# Patient Record
Sex: MALE | Race: WHITE | NOT HISPANIC OR LATINO | Employment: OTHER | URBAN - METROPOLITAN AREA
[De-identification: names, ages, dates, MRNs, and addresses within clinical notes are randomized per-mention and may not be internally consistent; named-entity substitution may affect disease eponyms.]

---

## 2019-01-01 ENCOUNTER — APPOINTMENT (OUTPATIENT)
Dept: RADIOLOGY | Facility: MEDICAL CENTER | Age: 49
DRG: 981 | End: 2019-01-01
Attending: INTERNAL MEDICINE

## 2019-01-01 ENCOUNTER — HOSPITAL ENCOUNTER (OUTPATIENT)
Facility: MEDICAL CENTER | Age: 49
DRG: 981 | End: 2019-03-15

## 2019-01-01 ENCOUNTER — HOSPITAL ENCOUNTER (INPATIENT)
Facility: MEDICAL CENTER | Age: 49
LOS: 4 days | DRG: 981 | End: 2019-03-19
Attending: HOSPITALIST | Admitting: INTERNAL MEDICINE

## 2019-01-01 ENCOUNTER — APPOINTMENT (OUTPATIENT)
Dept: CARDIOLOGY | Facility: MEDICAL CENTER | Age: 49
DRG: 981 | End: 2019-01-01
Attending: INTERNAL MEDICINE

## 2019-01-01 VITALS
TEMPERATURE: 101.8 F | HEIGHT: 71 IN | BODY MASS INDEX: 44.1 KG/M2 | HEART RATE: 76 BPM | OXYGEN SATURATION: 95 % | WEIGHT: 315 LBS

## 2019-01-01 DIAGNOSIS — J96.01 ACUTE HYPOXEMIC RESPIRATORY FAILURE (HCC): ICD-10-CM

## 2019-01-01 LAB
ACTION RANGE TRIGGERED IACRT: NO
ACTION RANGE TRIGGERED IACRT: NO
ACTION RANGE TRIGGERED IACRT: YES
ALBUMIN SERPL BCP-MCNC: 2.7 G/DL (ref 3.2–4.9)
ALBUMIN SERPL BCP-MCNC: 2.8 G/DL (ref 3.2–4.9)
ALBUMIN SERPL BCP-MCNC: 2.8 G/DL (ref 3.2–4.9)
ALBUMIN/GLOB SERPL: 0.8 G/DL
ALP SERPL-CCNC: 54 U/L (ref 30–99)
ALP SERPL-CCNC: 55 U/L (ref 30–99)
ALP SERPL-CCNC: 57 U/L (ref 30–99)
ALT SERPL-CCNC: 12 U/L (ref 2–50)
ALT SERPL-CCNC: 13 U/L (ref 2–50)
ALT SERPL-CCNC: 13 U/L (ref 2–50)
AMMONIA PLAS-SCNC: 53 UMOL/L (ref 11–45)
ANION GAP SERPL CALC-SCNC: 11 MMOL/L (ref 0–11.9)
ANION GAP SERPL CALC-SCNC: 5 MMOL/L (ref 0–11.9)
ANION GAP SERPL CALC-SCNC: 6 MMOL/L (ref 0–11.9)
ANION GAP SERPL CALC-SCNC: 7 MMOL/L (ref 0–11.9)
ANION GAP SERPL CALC-SCNC: 7 MMOL/L (ref 0–11.9)
ANION GAP SERPL CALC-SCNC: 9 MMOL/L (ref 0–11.9)
ANION GAP SERPL CALC-SCNC: 9 MMOL/L (ref 0–11.9)
APPEARANCE UR: CLEAR
APTT PPP: 30.8 SEC (ref 24.7–36)
AST SERPL-CCNC: 21 U/L (ref 12–45)
AST SERPL-CCNC: 27 U/L (ref 12–45)
AST SERPL-CCNC: 27 U/L (ref 12–45)
BACTERIA #/AREA URNS HPF: ABNORMAL /HPF
BACTERIA SPEC RESP CULT: NORMAL
BACTERIA SPEC RESP CULT: NORMAL
BASE EXCESS BLDA CALC-SCNC: 5 MMOL/L (ref -4–3)
BASE EXCESS BLDA CALC-SCNC: 7 MMOL/L (ref -4–3)
BASE EXCESS BLDA CALC-SCNC: 8 MMOL/L (ref -4–3)
BASE EXCESS BLDA CALC-SCNC: 9 MMOL/L (ref -4–3)
BASE EXCESS BLDA CALC-SCNC: 9 MMOL/L (ref -4–3)
BASOPHILS # BLD AUTO: 0.6 % (ref 0–1.8)
BASOPHILS # BLD AUTO: 0.6 % (ref 0–1.8)
BASOPHILS # BLD AUTO: 0.7 % (ref 0–1.8)
BASOPHILS # BLD AUTO: 0.8 % (ref 0–1.8)
BASOPHILS # BLD AUTO: 0.9 % (ref 0–1.8)
BASOPHILS # BLD: 0.05 K/UL (ref 0–0.12)
BASOPHILS # BLD: 0.07 K/UL (ref 0–0.12)
BASOPHILS # BLD: 0.08 K/UL (ref 0–0.12)
BILIRUB SERPL-MCNC: 0.9 MG/DL (ref 0.1–1.5)
BILIRUB SERPL-MCNC: 1.2 MG/DL (ref 0.1–1.5)
BILIRUB SERPL-MCNC: 1.2 MG/DL (ref 0.1–1.5)
BILIRUB UR QL STRIP.AUTO: NEGATIVE
BLOOD CULTURE HOLD CXBCH: NORMAL
BLOOD CULTURE HOLD CXBCH: NORMAL
BODY TEMPERATURE: ABNORMAL DEGREES
BUN SERPL-MCNC: 22 MG/DL (ref 8–22)
BUN SERPL-MCNC: 22 MG/DL (ref 8–22)
BUN SERPL-MCNC: 23 MG/DL (ref 8–22)
BUN SERPL-MCNC: 24 MG/DL (ref 8–22)
BUN SERPL-MCNC: 27 MG/DL (ref 8–22)
CALCIUM SERPL-MCNC: 8.3 MG/DL (ref 8.5–10.5)
CALCIUM SERPL-MCNC: 8.4 MG/DL (ref 8.5–10.5)
CALCIUM SERPL-MCNC: 8.5 MG/DL (ref 8.5–10.5)
CALCIUM SERPL-MCNC: 8.8 MG/DL (ref 8.5–10.5)
CALCIUM SERPL-MCNC: 8.9 MG/DL (ref 8.5–10.5)
CALCIUM SERPL-MCNC: 9.1 MG/DL (ref 8.5–10.5)
CALCIUM SERPL-MCNC: 9.3 MG/DL (ref 8.5–10.5)
CHLORIDE SERPL-SCNC: 101 MMOL/L (ref 96–112)
CHLORIDE SERPL-SCNC: 102 MMOL/L (ref 96–112)
CHLORIDE SERPL-SCNC: 103 MMOL/L (ref 96–112)
CHLORIDE SERPL-SCNC: 104 MMOL/L (ref 96–112)
CHLORIDE SERPL-SCNC: 105 MMOL/L (ref 96–112)
CHLORIDE SERPL-SCNC: 106 MMOL/L (ref 96–112)
CHLORIDE SERPL-SCNC: 107 MMOL/L (ref 96–112)
CO2 BLDA-SCNC: 32 MMOL/L (ref 20–33)
CO2 BLDA-SCNC: 34 MMOL/L (ref 20–33)
CO2 BLDA-SCNC: 35 MMOL/L (ref 20–33)
CO2 BLDA-SCNC: 36 MMOL/L (ref 20–33)
CO2 BLDA-SCNC: 37 MMOL/L (ref 20–33)
CO2 BLDA-SCNC: 37 MMOL/L (ref 20–33)
CO2 BLDA-SCNC: 39 MMOL/L (ref 20–33)
CO2 BLDA-SCNC: 39 MMOL/L (ref 20–33)
CO2 SERPL-SCNC: 32 MMOL/L (ref 20–33)
CO2 SERPL-SCNC: 33 MMOL/L (ref 20–33)
CO2 SERPL-SCNC: 34 MMOL/L (ref 20–33)
CO2 SERPL-SCNC: 35 MMOL/L (ref 20–33)
CO2 SERPL-SCNC: 36 MMOL/L (ref 20–33)
CO2 SERPL-SCNC: 36 MMOL/L (ref 20–33)
CO2 SERPL-SCNC: 39 MMOL/L (ref 20–33)
COLOR UR: YELLOW
CREAT SERPL-MCNC: 0.98 MG/DL (ref 0.5–1.4)
CREAT SERPL-MCNC: 1.03 MG/DL (ref 0.5–1.4)
CREAT SERPL-MCNC: 1.13 MG/DL (ref 0.5–1.4)
CREAT SERPL-MCNC: 1.17 MG/DL (ref 0.5–1.4)
CREAT SERPL-MCNC: 1.17 MG/DL (ref 0.5–1.4)
CREAT SERPL-MCNC: 1.22 MG/DL (ref 0.5–1.4)
CREAT SERPL-MCNC: 1.29 MG/DL (ref 0.5–1.4)
CRP SERPL HS-MCNC: 15.88 MG/DL (ref 0–0.75)
CRP SERPL HS-MCNC: 21.74 MG/DL (ref 0–0.75)
CYTOLOGY REG CYTOL: NORMAL
EKG IMPRESSION: NORMAL
EOSINOPHIL # BLD AUTO: 0.11 K/UL (ref 0–0.51)
EOSINOPHIL # BLD AUTO: 0.14 K/UL (ref 0–0.51)
EOSINOPHIL # BLD AUTO: 0.16 K/UL (ref 0–0.51)
EOSINOPHIL # BLD AUTO: 0.19 K/UL (ref 0–0.51)
EOSINOPHIL # BLD AUTO: 0.21 K/UL (ref 0–0.51)
EOSINOPHIL NFR BLD: 1.2 % (ref 0–6.9)
EOSINOPHIL NFR BLD: 1.3 % (ref 0–6.9)
EOSINOPHIL NFR BLD: 2.1 % (ref 0–6.9)
EOSINOPHIL NFR BLD: 2.1 % (ref 0–6.9)
EOSINOPHIL NFR BLD: 2.3 % (ref 0–6.9)
EPI CELLS #/AREA URNS HPF: ABNORMAL /HPF
ERYTHROCYTE [DISTWIDTH] IN BLOOD BY AUTOMATED COUNT: 55.1 FL (ref 35.9–50)
ERYTHROCYTE [DISTWIDTH] IN BLOOD BY AUTOMATED COUNT: 55.3 FL (ref 35.9–50)
ERYTHROCYTE [DISTWIDTH] IN BLOOD BY AUTOMATED COUNT: 55.6 FL (ref 35.9–50)
ERYTHROCYTE [DISTWIDTH] IN BLOOD BY AUTOMATED COUNT: 56.2 FL (ref 35.9–50)
ERYTHROCYTE [DISTWIDTH] IN BLOOD BY AUTOMATED COUNT: 56.7 FL (ref 35.9–50)
EST. AVERAGE GLUCOSE BLD GHB EST-MCNC: 217 MG/DL
GLOBULIN SER CALC-MCNC: 3.3 G/DL (ref 1.9–3.5)
GLOBULIN SER CALC-MCNC: 3.4 G/DL (ref 1.9–3.5)
GLOBULIN SER CALC-MCNC: 3.5 G/DL (ref 1.9–3.5)
GLUCOSE BLD-MCNC: 104 MG/DL (ref 65–99)
GLUCOSE BLD-MCNC: 128 MG/DL (ref 65–99)
GLUCOSE BLD-MCNC: 129 MG/DL (ref 65–99)
GLUCOSE BLD-MCNC: 137 MG/DL (ref 65–99)
GLUCOSE BLD-MCNC: 142 MG/DL (ref 65–99)
GLUCOSE BLD-MCNC: 145 MG/DL (ref 65–99)
GLUCOSE BLD-MCNC: 146 MG/DL (ref 65–99)
GLUCOSE BLD-MCNC: 147 MG/DL (ref 65–99)
GLUCOSE BLD-MCNC: 152 MG/DL (ref 65–99)
GLUCOSE BLD-MCNC: 157 MG/DL (ref 65–99)
GLUCOSE BLD-MCNC: 162 MG/DL (ref 65–99)
GLUCOSE BLD-MCNC: 175 MG/DL (ref 65–99)
GLUCOSE BLD-MCNC: 188 MG/DL (ref 65–99)
GLUCOSE SERPL-MCNC: 119 MG/DL (ref 65–99)
GLUCOSE SERPL-MCNC: 135 MG/DL (ref 65–99)
GLUCOSE SERPL-MCNC: 135 MG/DL (ref 65–99)
GLUCOSE SERPL-MCNC: 136 MG/DL (ref 65–99)
GLUCOSE SERPL-MCNC: 163 MG/DL (ref 65–99)
GLUCOSE SERPL-MCNC: 170 MG/DL (ref 65–99)
GLUCOSE SERPL-MCNC: 206 MG/DL (ref 65–99)
GLUCOSE UR STRIP.AUTO-MCNC: NEGATIVE MG/DL
GRAM STN SPEC: NORMAL
HBA1C MFR BLD: 9.2 % (ref 0–5.6)
HCO3 BLDA-SCNC: 30.8 MMOL/L (ref 17–25)
HCO3 BLDA-SCNC: 32.8 MMOL/L (ref 17–25)
HCO3 BLDA-SCNC: 33.6 MMOL/L (ref 17–25)
HCO3 BLDA-SCNC: 34.3 MMOL/L (ref 17–25)
HCO3 BLDA-SCNC: 35.5 MMOL/L (ref 17–25)
HCO3 BLDA-SCNC: 35.6 MMOL/L (ref 17–25)
HCO3 BLDA-SCNC: 36.7 MMOL/L (ref 17–25)
HCO3 BLDA-SCNC: 36.8 MMOL/L (ref 17–25)
HCT VFR BLD AUTO: 53.5 % (ref 42–52)
HCT VFR BLD AUTO: 54.2 % (ref 42–52)
HCT VFR BLD AUTO: 54.9 % (ref 42–52)
HCT VFR BLD AUTO: 58.1 % (ref 42–52)
HCT VFR BLD AUTO: 58.8 % (ref 42–52)
HGB BLD-MCNC: 16.4 G/DL (ref 14–18)
HGB BLD-MCNC: 16.5 G/DL (ref 14–18)
HGB BLD-MCNC: 16.6 G/DL (ref 14–18)
HGB BLD-MCNC: 18.5 G/DL (ref 14–18)
HGB BLD-MCNC: 18.5 G/DL (ref 14–18)
HOROWITZ INDEX BLDA+IHG-RTO: 100 MM[HG]
HOROWITZ INDEX BLDA+IHG-RTO: 128 MM[HG]
HOROWITZ INDEX BLDA+IHG-RTO: 176 MM[HG]
HOROWITZ INDEX BLDA+IHG-RTO: 193 MM[HG]
HOROWITZ INDEX BLDA+IHG-RTO: 311 MM[HG]
HOROWITZ INDEX BLDA+IHG-RTO: 68 MM[HG]
HOROWITZ INDEX BLDA+IHG-RTO: 69 MM[HG]
HOROWITZ INDEX BLDA+IHG-RTO: 96 MM[HG]
HYALINE CASTS #/AREA URNS LPF: ABNORMAL /LPF
IMM GRANULOCYTES # BLD AUTO: 0.02 K/UL (ref 0–0.11)
IMM GRANULOCYTES # BLD AUTO: 0.02 K/UL (ref 0–0.11)
IMM GRANULOCYTES # BLD AUTO: 0.04 K/UL (ref 0–0.11)
IMM GRANULOCYTES # BLD AUTO: 0.05 K/UL (ref 0–0.11)
IMM GRANULOCYTES # BLD AUTO: 0.06 K/UL (ref 0–0.11)
IMM GRANULOCYTES NFR BLD AUTO: 0.2 % (ref 0–0.9)
IMM GRANULOCYTES NFR BLD AUTO: 0.3 % (ref 0–0.9)
IMM GRANULOCYTES NFR BLD AUTO: 0.4 % (ref 0–0.9)
IMM GRANULOCYTES NFR BLD AUTO: 0.5 % (ref 0–0.9)
IMM GRANULOCYTES NFR BLD AUTO: 0.5 % (ref 0–0.9)
INR PPP: 1.33 (ref 0.87–1.13)
INST. QUALIFIED PATIENT IIQPT: YES
KETONES UR STRIP.AUTO-MCNC: NEGATIVE MG/DL
LEUKOCYTE ESTERASE UR QL STRIP.AUTO: NEGATIVE
LIPASE SERPL-CCNC: 36 U/L (ref 11–82)
LYMPHOCYTES # BLD AUTO: 0.58 K/UL (ref 1–4.8)
LYMPHOCYTES # BLD AUTO: 0.75 K/UL (ref 1–4.8)
LYMPHOCYTES # BLD AUTO: 0.91 K/UL (ref 1–4.8)
LYMPHOCYTES # BLD AUTO: 1.02 K/UL (ref 1–4.8)
LYMPHOCYTES # BLD AUTO: 1.02 K/UL (ref 1–4.8)
LYMPHOCYTES NFR BLD: 12.2 % (ref 22–41)
LYMPHOCYTES NFR BLD: 13.2 % (ref 22–41)
LYMPHOCYTES NFR BLD: 6.2 % (ref 22–41)
LYMPHOCYTES NFR BLD: 6.8 % (ref 22–41)
LYMPHOCYTES NFR BLD: 9.1 % (ref 22–41)
MAGNESIUM SERPL-MCNC: 1.8 MG/DL (ref 1.5–2.5)
MAGNESIUM SERPL-MCNC: 1.8 MG/DL (ref 1.5–2.5)
MAGNESIUM SERPL-MCNC: 2 MG/DL (ref 1.5–2.5)
MAGNESIUM SERPL-MCNC: 2.1 MG/DL (ref 1.5–2.5)
MAGNESIUM SERPL-MCNC: 2.2 MG/DL (ref 1.5–2.5)
MCH RBC QN AUTO: 29.7 PG (ref 27–33)
MCH RBC QN AUTO: 29.8 PG (ref 27–33)
MCH RBC QN AUTO: 29.8 PG (ref 27–33)
MCH RBC QN AUTO: 29.9 PG (ref 27–33)
MCH RBC QN AUTO: 30.5 PG (ref 27–33)
MCHC RBC AUTO-ENTMCNC: 30.2 G/DL (ref 33.7–35.3)
MCHC RBC AUTO-ENTMCNC: 30.4 G/DL (ref 33.7–35.3)
MCHC RBC AUTO-ENTMCNC: 30.7 G/DL (ref 33.7–35.3)
MCHC RBC AUTO-ENTMCNC: 31.5 G/DL (ref 33.7–35.3)
MCHC RBC AUTO-ENTMCNC: 31.8 G/DL (ref 33.7–35.3)
MCV RBC AUTO: 94.7 FL (ref 81.4–97.8)
MCV RBC AUTO: 95.7 FL (ref 81.4–97.8)
MCV RBC AUTO: 96.9 FL (ref 81.4–97.8)
MCV RBC AUTO: 97.8 FL (ref 81.4–97.8)
MCV RBC AUTO: 98.7 FL (ref 81.4–97.8)
MICRO URNS: ABNORMAL
MONOCYTES # BLD AUTO: 0.66 K/UL (ref 0–0.85)
MONOCYTES # BLD AUTO: 0.77 K/UL (ref 0–0.85)
MONOCYTES # BLD AUTO: 1.02 K/UL (ref 0–0.85)
MONOCYTES # BLD AUTO: 1.02 K/UL (ref 0–0.85)
MONOCYTES # BLD AUTO: 1.03 K/UL (ref 0–0.85)
MONOCYTES NFR BLD AUTO: 10.3 % (ref 0–13.4)
MONOCYTES NFR BLD AUTO: 10.9 % (ref 0–13.4)
MONOCYTES NFR BLD AUTO: 12.2 % (ref 0–13.4)
MONOCYTES NFR BLD AUTO: 7 % (ref 0–13.4)
MONOCYTES NFR BLD AUTO: 8.5 % (ref 0–13.4)
NEUTROPHILS # BLD AUTO: 5.81 K/UL (ref 1.82–7.42)
NEUTROPHILS # BLD AUTO: 6.09 K/UL (ref 1.82–7.42)
NEUTROPHILS # BLD AUTO: 7.58 K/UL (ref 1.82–7.42)
NEUTROPHILS # BLD AUTO: 7.72 K/UL (ref 1.82–7.42)
NEUTROPHILS # BLD AUTO: 9.26 K/UL (ref 1.82–7.42)
NEUTROPHILS NFR BLD: 72.5 % (ref 44–72)
NEUTROPHILS NFR BLD: 75 % (ref 44–72)
NEUTROPHILS NFR BLD: 77.2 % (ref 44–72)
NEUTROPHILS NFR BLD: 80.6 % (ref 44–72)
NEUTROPHILS NFR BLD: 83.8 % (ref 44–72)
NITRITE UR QL STRIP.AUTO: NEGATIVE
NRBC # BLD AUTO: 0 K/UL
NRBC BLD-RTO: 0 /100 WBC
O2/TOTAL GAS SETTING VFR VENT: 100 %
O2/TOTAL GAS SETTING VFR VENT: 50 %
O2/TOTAL GAS SETTING VFR VENT: 60 %
O2/TOTAL GAS SETTING VFR VENT: 80 %
O2/TOTAL GAS SETTING VFR VENT: 80 %
PCO2 BLDA: 47.3 MMHG (ref 26–37)
PCO2 BLDA: 47.4 MMHG (ref 26–37)
PCO2 BLDA: 49.9 MMHG (ref 26–37)
PCO2 BLDA: 54.2 MMHG (ref 26–37)
PCO2 BLDA: 54.8 MMHG (ref 26–37)
PCO2 BLDA: 59.4 MMHG (ref 26–37)
PCO2 BLDA: 60.5 MMHG (ref 26–37)
PCO2 BLDA: 64 MMHG (ref 26–37)
PCO2 TEMP ADJ BLDA: 44.3 MMHG (ref 26–37)
PCO2 TEMP ADJ BLDA: 48.7 MMHG (ref 26–37)
PCO2 TEMP ADJ BLDA: 54.2 MMHG (ref 26–37)
PCO2 TEMP ADJ BLDA: 55.9 MMHG (ref 26–37)
PCO2 TEMP ADJ BLDA: 59 MMHG (ref 26–37)
PCO2 TEMP ADJ BLDA: 60.7 MMHG (ref 26–37)
PCO2 TEMP ADJ BLDA: 64 MMHG (ref 26–37)
PCO2 TEMP ADJ BLDA: 68.4 MMHG (ref 26–37)
PH BLDA: 7.37 [PH] (ref 7.4–7.5)
PH BLDA: 7.39 [PH] (ref 7.4–7.5)
PH BLDA: 7.39 [PH] (ref 7.4–7.5)
PH BLDA: 7.4 [PH] (ref 7.4–7.5)
PH BLDA: 7.41 [PH] (ref 7.4–7.5)
PH BLDA: 7.42 [PH] (ref 7.4–7.5)
PH BLDA: 7.45 [PH] (ref 7.4–7.5)
PH BLDA: 7.46 [PH] (ref 7.4–7.5)
PH TEMP ADJ BLDA: 7.34 [PH] (ref 7.4–7.5)
PH TEMP ADJ BLDA: 7.37 [PH] (ref 7.4–7.5)
PH TEMP ADJ BLDA: 7.37 [PH] (ref 7.4–7.5)
PH TEMP ADJ BLDA: 7.38 [PH] (ref 7.4–7.5)
PH TEMP ADJ BLDA: 7.38 [PH] (ref 7.4–7.5)
PH TEMP ADJ BLDA: 7.41 [PH] (ref 7.4–7.5)
PH TEMP ADJ BLDA: 7.44 [PH] (ref 7.4–7.5)
PH TEMP ADJ BLDA: 7.48 [PH] (ref 7.4–7.5)
PH UR STRIP.AUTO: 8 [PH]
PHOSPHATE SERPL-MCNC: 3.1 MG/DL (ref 2.5–4.5)
PHOSPHATE SERPL-MCNC: 4.6 MG/DL (ref 2.5–4.5)
PHOSPHATE SERPL-MCNC: 5.2 MG/DL (ref 2.5–4.5)
PLATELET # BLD AUTO: 171 K/UL (ref 164–446)
PLATELET # BLD AUTO: 181 K/UL (ref 164–446)
PLATELET # BLD AUTO: 193 K/UL (ref 164–446)
PLATELET # BLD AUTO: 219 K/UL (ref 164–446)
PLATELET # BLD AUTO: 238 K/UL (ref 164–446)
PMV BLD AUTO: 10.5 FL (ref 9–12.9)
PMV BLD AUTO: 10.6 FL (ref 9–12.9)
PMV BLD AUTO: 10.8 FL (ref 9–12.9)
PMV BLD AUTO: 11.1 FL (ref 9–12.9)
PMV BLD AUTO: 11.1 FL (ref 9–12.9)
PO2 BLDA: 102 MMHG (ref 64–87)
PO2 BLDA: 154 MMHG (ref 64–87)
PO2 BLDA: 311 MMHG (ref 64–87)
PO2 BLDA: 60 MMHG (ref 64–87)
PO2 BLDA: 68 MMHG (ref 64–87)
PO2 BLDA: 69 MMHG (ref 64–87)
PO2 BLDA: 88 MMHG (ref 64–87)
PO2 BLDA: 96 MMHG (ref 64–87)
PO2 TEMP ADJ BLDA: 100 MMHG (ref 64–87)
PO2 TEMP ADJ BLDA: 112 MMHG (ref 64–87)
PO2 TEMP ADJ BLDA: 162 MMHG (ref 64–87)
PO2 TEMP ADJ BLDA: 319 MMHG (ref 64–87)
PO2 TEMP ADJ BLDA: 61 MMHG (ref 64–87)
PO2 TEMP ADJ BLDA: 68 MMHG (ref 64–87)
PO2 TEMP ADJ BLDA: 71 MMHG (ref 64–87)
PO2 TEMP ADJ BLDA: 91 MMHG (ref 64–87)
POTASSIUM SERPL-SCNC: 3.1 MMOL/L (ref 3.6–5.5)
POTASSIUM SERPL-SCNC: 3.2 MMOL/L (ref 3.6–5.5)
POTASSIUM SERPL-SCNC: 3.2 MMOL/L (ref 3.6–5.5)
POTASSIUM SERPL-SCNC: 3.3 MMOL/L (ref 3.6–5.5)
POTASSIUM SERPL-SCNC: 3.4 MMOL/L (ref 3.6–5.5)
POTASSIUM SERPL-SCNC: 3.5 MMOL/L (ref 3.6–5.5)
POTASSIUM SERPL-SCNC: 3.6 MMOL/L (ref 3.6–5.5)
POTASSIUM SERPL-SCNC: 3.7 MMOL/L (ref 3.6–5.5)
POTASSIUM SERPL-SCNC: 3.7 MMOL/L (ref 3.6–5.5)
POTASSIUM SERPL-SCNC: 3.8 MMOL/L (ref 3.6–5.5)
PREALB SERPL-MCNC: 3 MG/DL (ref 18–38)
PREALB SERPL-MCNC: 7 MG/DL (ref 18–38)
PROCALCITONIN SERPL-MCNC: 0.35 NG/ML
PROCALCITONIN SERPL-MCNC: 0.67 NG/ML
PROT SERPL-MCNC: 6.1 G/DL (ref 6–8.2)
PROT SERPL-MCNC: 6.1 G/DL (ref 6–8.2)
PROT SERPL-MCNC: 6.3 G/DL (ref 6–8.2)
PROT UR QL STRIP: NEGATIVE MG/DL
PROTHROMBIN TIME: 16.6 SEC (ref 12–14.6)
RBC # BLD AUTO: 5.52 M/UL (ref 4.7–6.1)
RBC # BLD AUTO: 5.54 M/UL (ref 4.7–6.1)
RBC # BLD AUTO: 5.56 M/UL (ref 4.7–6.1)
RBC # BLD AUTO: 6.07 M/UL (ref 4.7–6.1)
RBC # BLD AUTO: 6.21 M/UL (ref 4.7–6.1)
RBC # URNS HPF: ABNORMAL /HPF
RBC UR QL AUTO: ABNORMAL
SAO2 % BLDA: 100 % (ref 93–99)
SAO2 % BLDA: 90 % (ref 93–99)
SAO2 % BLDA: 94 % (ref 93–99)
SAO2 % BLDA: 94 % (ref 93–99)
SAO2 % BLDA: 96 % (ref 93–99)
SAO2 % BLDA: 97 % (ref 93–99)
SAO2 % BLDA: 97 % (ref 93–99)
SAO2 % BLDA: 99 % (ref 93–99)
SIGNIFICANT IND 70042: NORMAL
SITE SITE: NORMAL
SODIUM SERPL-SCNC: 145 MMOL/L (ref 135–145)
SODIUM SERPL-SCNC: 145 MMOL/L (ref 135–145)
SODIUM SERPL-SCNC: 146 MMOL/L (ref 135–145)
SODIUM SERPL-SCNC: 146 MMOL/L (ref 135–145)
SODIUM SERPL-SCNC: 148 MMOL/L (ref 135–145)
SODIUM SERPL-SCNC: 148 MMOL/L (ref 135–145)
SODIUM SERPL-SCNC: 149 MMOL/L (ref 135–145)
SOURCE SOURCE: NORMAL
SP GR UR STRIP.AUTO: 1.01
SPECIMEN DRAWN FROM PATIENT: ABNORMAL
TRIGL SERPL-MCNC: 121 MG/DL (ref 0–149)
TRIGL SERPL-MCNC: 174 MG/DL (ref 0–149)
UROBILINOGEN UR STRIP.AUTO-MCNC: 0.2 MG/DL
WBC # BLD AUTO: 10 K/UL (ref 4.8–10.8)
WBC # BLD AUTO: 11.1 K/UL (ref 4.8–10.8)
WBC # BLD AUTO: 7.7 K/UL (ref 4.8–10.8)
WBC # BLD AUTO: 8.4 K/UL (ref 4.8–10.8)
WBC # BLD AUTO: 9.4 K/UL (ref 4.8–10.8)
WBC #/AREA URNS HPF: ABNORMAL /HPF

## 2019-01-01 PROCEDURE — 700111 HCHG RX REV CODE 636 W/ 250 OVERRIDE (IP): Performed by: INTERNAL MEDICINE

## 2019-01-01 PROCEDURE — 82803 BLOOD GASES ANY COMBINATION: CPT | Mod: 91

## 2019-01-01 PROCEDURE — 87070 CULTURE OTHR SPECIMN AEROBIC: CPT

## 2019-01-01 PROCEDURE — 87205 SMEAR GRAM STAIN: CPT

## 2019-01-01 PROCEDURE — 770022 HCHG ROOM/CARE - ICU (200)

## 2019-01-01 PROCEDURE — 0BC38ZZ EXTIRPATION OF MATTER FROM RIGHT MAIN BRONCHUS, VIA NATURAL OR ARTIFICIAL OPENING ENDOSCOPIC: ICD-10-PCS | Performed by: INTERNAL MEDICINE

## 2019-01-01 PROCEDURE — 302977 HCHG BRONCHOSCOPY PROC-THERAPEUTIC

## 2019-01-01 PROCEDURE — 700102 HCHG RX REV CODE 250 W/ 637 OVERRIDE(OP): Performed by: INTERNAL MEDICINE

## 2019-01-01 PROCEDURE — 94640 AIRWAY INHALATION TREATMENT: CPT

## 2019-01-01 PROCEDURE — 36600 WITHDRAWAL OF ARTERIAL BLOOD: CPT

## 2019-01-01 PROCEDURE — 700105 HCHG RX REV CODE 258: Performed by: INTERNAL MEDICINE

## 2019-01-01 PROCEDURE — 03HY32Z INSERTION OF MONITORING DEVICE INTO UPPER ARTERY, PERCUTANEOUS APPROACH: ICD-10-PCS | Performed by: INTERNAL MEDICINE

## 2019-01-01 PROCEDURE — 0BC58ZZ EXTIRPATION OF MATTER FROM RIGHT MIDDLE LOBE BRONCHUS, VIA NATURAL OR ARTIFICIAL OPENING ENDOSCOPIC: ICD-10-PCS | Performed by: INTERNAL MEDICINE

## 2019-01-01 PROCEDURE — 31624 DX BRONCHOSCOPE/LAVAGE: CPT | Performed by: INTERNAL MEDICINE

## 2019-01-01 PROCEDURE — 84478 ASSAY OF TRIGLYCERIDES: CPT

## 2019-01-01 PROCEDURE — 700101 HCHG RX REV CODE 250: Performed by: INTERNAL MEDICINE

## 2019-01-01 PROCEDURE — 302154 K THERMIA BLANKET 24X60: Performed by: INTERNAL MEDICINE

## 2019-01-01 PROCEDURE — 99291 CRITICAL CARE FIRST HOUR: CPT | Mod: 25 | Performed by: INTERNAL MEDICINE

## 2019-01-01 PROCEDURE — 80048 BASIC METABOLIC PNL TOTAL CA: CPT

## 2019-01-01 PROCEDURE — C1751 CATH, INF, PER/CENT/MIDLINE: HCPCS

## 2019-01-01 PROCEDURE — 99233 SBSQ HOSP IP/OBS HIGH 50: CPT | Performed by: INTERNAL MEDICINE

## 2019-01-01 PROCEDURE — 80053 COMPREHEN METABOLIC PANEL: CPT

## 2019-01-01 PROCEDURE — 84132 ASSAY OF SERUM POTASSIUM: CPT

## 2019-01-01 PROCEDURE — 85025 COMPLETE CBC W/AUTO DIFF WBC: CPT

## 2019-01-01 PROCEDURE — 86140 C-REACTIVE PROTEIN: CPT

## 2019-01-01 PROCEDURE — 94002 VENT MGMT INPAT INIT DAY: CPT

## 2019-01-01 PROCEDURE — 88112 CYTOPATH CELL ENHANCE TECH: CPT

## 2019-01-01 PROCEDURE — 93970 EXTREMITY STUDY: CPT | Mod: 26 | Performed by: SURGERY

## 2019-01-01 PROCEDURE — 700105 HCHG RX REV CODE 258

## 2019-01-01 PROCEDURE — 93005 ELECTROCARDIOGRAM TRACING: CPT | Performed by: INTERNAL MEDICINE

## 2019-01-01 PROCEDURE — 3E0G76Z INTRODUCTION OF NUTRITIONAL SUBSTANCE INTO UPPER GI, VIA NATURAL OR ARTIFICIAL OPENING: ICD-10-PCS | Performed by: INTERNAL MEDICINE

## 2019-01-01 PROCEDURE — 71045 X-RAY EXAM CHEST 1 VIEW: CPT

## 2019-01-01 PROCEDURE — 99292 CRITICAL CARE ADDL 30 MIN: CPT | Mod: 25 | Performed by: INTERNAL MEDICINE

## 2019-01-01 PROCEDURE — 82803 BLOOD GASES ANY COMBINATION: CPT

## 2019-01-01 PROCEDURE — 94003 VENT MGMT INPAT SUBQ DAY: CPT

## 2019-01-01 PROCEDURE — 71045 X-RAY EXAM CHEST 1 VIEW: CPT | Performed by: RADIOLOGY

## 2019-01-01 PROCEDURE — 3E043XZ INTRODUCTION OF VASOPRESSOR INTO CENTRAL VEIN, PERCUTANEOUS APPROACH: ICD-10-PCS | Performed by: INTERNAL MEDICINE

## 2019-01-01 PROCEDURE — 93010 ELECTROCARDIOGRAM REPORT: CPT | Performed by: INTERNAL MEDICINE

## 2019-01-01 PROCEDURE — 84145 PROCALCITONIN (PCT): CPT

## 2019-01-01 PROCEDURE — 0WCQ8ZZ EXTIRPATION OF MATTER FROM RESPIRATORY TRACT, VIA NATURAL OR ARTIFICIAL OPENING ENDOSCOPIC: ICD-10-PCS | Performed by: INTERNAL MEDICINE

## 2019-01-01 PROCEDURE — 36620 INSERTION CATHETER ARTERY: CPT | Performed by: INTERNAL MEDICINE

## 2019-01-01 PROCEDURE — 36556 INSERT NON-TUNNEL CV CATH: CPT | Mod: LT | Performed by: INTERNAL MEDICINE

## 2019-01-01 PROCEDURE — 87040 BLOOD CULTURE FOR BACTERIA: CPT | Mod: 91

## 2019-01-01 PROCEDURE — A9270 NON-COVERED ITEM OR SERVICE: HCPCS | Performed by: INTERNAL MEDICINE

## 2019-01-01 PROCEDURE — 0B9F8ZX DRAINAGE OF RIGHT LOWER LUNG LOBE, VIA NATURAL OR ARTIFICIAL OPENING ENDOSCOPIC, DIAGNOSTIC: ICD-10-PCS | Performed by: INTERNAL MEDICINE

## 2019-01-01 PROCEDURE — 93970 EXTREMITY STUDY: CPT

## 2019-01-01 PROCEDURE — 82962 GLUCOSE BLOOD TEST: CPT | Mod: 91

## 2019-01-01 PROCEDURE — 99232 SBSQ HOSP IP/OBS MODERATE 35: CPT | Performed by: INTERNAL MEDICINE

## 2019-01-01 PROCEDURE — 99223 1ST HOSP IP/OBS HIGH 75: CPT | Performed by: HOSPITALIST

## 2019-01-01 PROCEDURE — 02HV33Z INSERTION OF INFUSION DEVICE INTO SUPERIOR VENA CAVA, PERCUTANEOUS APPROACH: ICD-10-PCS | Performed by: INTERNAL MEDICINE

## 2019-01-01 PROCEDURE — 83690 ASSAY OF LIPASE: CPT

## 2019-01-01 PROCEDURE — 80053 COMPREHEN METABOLIC PANEL: CPT | Mod: 91

## 2019-01-01 PROCEDURE — 84134 ASSAY OF PREALBUMIN: CPT

## 2019-01-01 PROCEDURE — 82962 GLUCOSE BLOOD TEST: CPT

## 2019-01-01 PROCEDURE — 84100 ASSAY OF PHOSPHORUS: CPT

## 2019-01-01 PROCEDURE — 5A1955Z RESPIRATORY VENTILATION, GREATER THAN 96 CONSECUTIVE HOURS: ICD-10-PCS | Performed by: HOSPITALIST

## 2019-01-01 PROCEDURE — 85730 THROMBOPLASTIN TIME PARTIAL: CPT

## 2019-01-01 PROCEDURE — 81001 URINALYSIS AUTO W/SCOPE: CPT

## 2019-01-01 PROCEDURE — 84132 ASSAY OF SERUM POTASSIUM: CPT | Mod: 91

## 2019-01-01 PROCEDURE — 83735 ASSAY OF MAGNESIUM: CPT

## 2019-01-01 PROCEDURE — 37799 UNLISTED PX VASCULAR SURGERY: CPT

## 2019-01-01 PROCEDURE — 84478 ASSAY OF TRIGLYCERIDES: CPT | Mod: 91

## 2019-01-01 PROCEDURE — 83735 ASSAY OF MAGNESIUM: CPT | Mod: 91

## 2019-01-01 PROCEDURE — 0B9M8ZZ DRAINAGE OF BILATERAL LUNGS, VIA NATURAL OR ARTIFICIAL OPENING ENDOSCOPIC: ICD-10-PCS | Performed by: INTERNAL MEDICINE

## 2019-01-01 PROCEDURE — 99291 CRITICAL CARE FIRST HOUR: CPT | Performed by: INTERNAL MEDICINE

## 2019-01-01 PROCEDURE — 31645 BRNCHSC W/THER ASPIR 1ST: CPT | Performed by: INTERNAL MEDICINE

## 2019-01-01 PROCEDURE — 36620 INSERTION CATHETER ARTERY: CPT

## 2019-01-01 PROCEDURE — 302132 K THERMIA MOTOR: Performed by: INTERNAL MEDICINE

## 2019-01-01 PROCEDURE — 88305 TISSUE EXAM BY PATHOLOGIST: CPT

## 2019-01-01 PROCEDURE — 0B9J8ZX DRAINAGE OF LEFT LOWER LUNG LOBE, VIA NATURAL OR ARTIFICIAL OPENING ENDOSCOPIC, DIAGNOSTIC: ICD-10-PCS | Performed by: INTERNAL MEDICINE

## 2019-01-01 PROCEDURE — 85025 COMPLETE CBC W/AUTO DIFF WBC: CPT | Mod: 91

## 2019-01-01 PROCEDURE — 0BCB8ZZ EXTIRPATION OF MATTER FROM LEFT LOWER LOBE BRONCHUS, VIA NATURAL OR ARTIFICIAL OPENING ENDOSCOPIC: ICD-10-PCS | Performed by: INTERNAL MEDICINE

## 2019-01-01 PROCEDURE — B548ZZA ULTRASONOGRAPHY OF SUPERIOR VENA CAVA, GUIDANCE: ICD-10-PCS | Performed by: INTERNAL MEDICINE

## 2019-01-01 PROCEDURE — 0BC68ZZ EXTIRPATION OF MATTER FROM RIGHT LOWER LOBE BRONCHUS, VIA NATURAL OR ARTIFICIAL OPENING ENDOSCOPIC: ICD-10-PCS | Performed by: INTERNAL MEDICINE

## 2019-01-01 PROCEDURE — 99292 CRITICAL CARE ADDL 30 MIN: CPT | Performed by: INTERNAL MEDICINE

## 2019-01-01 PROCEDURE — 0BC78ZZ EXTIRPATION OF MATTER FROM LEFT MAIN BRONCHUS, VIA NATURAL OR ARTIFICIAL OPENING ENDOSCOPIC: ICD-10-PCS | Performed by: INTERNAL MEDICINE

## 2019-01-01 PROCEDURE — 85610 PROTHROMBIN TIME: CPT

## 2019-01-01 PROCEDURE — 306802 CATHETER,INSTAFLOW BOWEL: Performed by: INTERNAL MEDICINE

## 2019-01-01 PROCEDURE — 302978 HCHG BRONCHOSCOPY-DIAGNOSTIC

## 2019-01-01 PROCEDURE — 83036 HEMOGLOBIN GLYCOSYLATED A1C: CPT

## 2019-01-01 PROCEDURE — 99255 IP/OBS CONSLTJ NEW/EST HI 80: CPT | Performed by: INTERNAL MEDICINE

## 2019-01-01 PROCEDURE — 36556 INSERT NON-TUNNEL CV CATH: CPT

## 2019-01-01 PROCEDURE — 82140 ASSAY OF AMMONIA: CPT

## 2019-01-01 RX ORDER — PROPOFOL 10 MG/ML
100 INJECTION, EMULSION INTRAVENOUS ONCE
Status: COMPLETED | OUTPATIENT
Start: 2019-01-01 | End: 2019-01-01

## 2019-01-01 RX ORDER — POTASSIUM CHLORIDE 14.9 MG/ML
20 INJECTION INTRAVENOUS ONCE
Status: COMPLETED | OUTPATIENT
Start: 2019-01-01 | End: 2019-01-01

## 2019-01-01 RX ORDER — DEXTROSE MONOHYDRATE 25 G/50ML
25 INJECTION, SOLUTION INTRAVENOUS
Status: DISCONTINUED | OUTPATIENT
Start: 2019-01-01 | End: 2019-01-01

## 2019-01-01 RX ORDER — MAGNESIUM SULFATE HEPTAHYDRATE 40 MG/ML
2 INJECTION, SOLUTION INTRAVENOUS ONCE
Status: COMPLETED | OUTPATIENT
Start: 2019-01-01 | End: 2019-01-01

## 2019-01-01 RX ORDER — SODIUM CHLORIDE 9 MG/ML
INJECTION, SOLUTION INTRAVENOUS
Status: COMPLETED
Start: 2019-01-01 | End: 2019-01-01

## 2019-01-01 RX ORDER — AMOXICILLIN 250 MG
2 CAPSULE ORAL 2 TIMES DAILY
Status: DISCONTINUED | OUTPATIENT
Start: 2019-01-01 | End: 2019-01-01

## 2019-01-01 RX ORDER — ATROPINE SULFATE 10 MG/ML
2 SOLUTION/ DROPS OPHTHALMIC EVERY 4 HOURS PRN
Status: DISCONTINUED | OUTPATIENT
Start: 2019-01-01 | End: 2019-01-01 | Stop reason: HOSPADM

## 2019-01-01 RX ORDER — ACETAMINOPHEN 325 MG/1
650 TABLET ORAL EVERY 4 HOURS PRN
Status: DISCONTINUED | OUTPATIENT
Start: 2019-01-01 | End: 2019-01-01

## 2019-01-01 RX ORDER — POTASSIUM CHLORIDE 7.45 MG/ML
10 INJECTION INTRAVENOUS ONCE
Status: COMPLETED | OUTPATIENT
Start: 2019-01-01 | End: 2019-01-01

## 2019-01-01 RX ORDER — POLYETHYLENE GLYCOL 3350 17 G/17G
1 POWDER, FOR SOLUTION ORAL
Status: DISCONTINUED | OUTPATIENT
Start: 2019-01-01 | End: 2019-01-01

## 2019-01-01 RX ORDER — IPRATROPIUM BROMIDE AND ALBUTEROL SULFATE 2.5; .5 MG/3ML; MG/3ML
3 SOLUTION RESPIRATORY (INHALATION)
Status: DISCONTINUED | OUTPATIENT
Start: 2019-01-01 | End: 2019-01-01

## 2019-01-01 RX ORDER — FUROSEMIDE 10 MG/ML
60 INJECTION INTRAMUSCULAR; INTRAVENOUS
Status: DISCONTINUED | OUTPATIENT
Start: 2019-01-01 | End: 2019-01-01

## 2019-01-01 RX ORDER — FAMOTIDINE 20 MG/1
20 TABLET, FILM COATED ORAL EVERY 12 HOURS
Status: DISCONTINUED | OUTPATIENT
Start: 2019-01-01 | End: 2019-01-01

## 2019-01-01 RX ORDER — FUROSEMIDE 10 MG/ML
80 INJECTION INTRAMUSCULAR; INTRAVENOUS ONCE
Status: COMPLETED | OUTPATIENT
Start: 2019-01-01 | End: 2019-01-01

## 2019-01-01 RX ORDER — BISACODYL 10 MG
10 SUPPOSITORY, RECTAL RECTAL
Status: DISCONTINUED | OUTPATIENT
Start: 2019-01-01 | End: 2019-01-01

## 2019-01-01 RX ORDER — LACTULOSE 20 G/30ML
30 SOLUTION ORAL 3 TIMES DAILY
Status: DISCONTINUED | OUTPATIENT
Start: 2019-01-01 | End: 2019-01-01

## 2019-01-01 RX ORDER — HYDROMORPHONE HYDROCHLORIDE 2 MG/ML
2-4 INJECTION, SOLUTION INTRAMUSCULAR; INTRAVENOUS; SUBCUTANEOUS
Status: DISCONTINUED | OUTPATIENT
Start: 2019-01-01 | End: 2019-01-01 | Stop reason: HOSPADM

## 2019-01-01 RX ORDER — LORAZEPAM 2 MG/ML
1 CONCENTRATE ORAL
Status: DISCONTINUED | OUTPATIENT
Start: 2019-01-01 | End: 2019-01-01 | Stop reason: HOSPADM

## 2019-01-01 RX ORDER — LORAZEPAM 2 MG/ML
1-2 INJECTION INTRAMUSCULAR
Status: DISCONTINUED | OUTPATIENT
Start: 2019-01-01 | End: 2019-01-01 | Stop reason: HOSPADM

## 2019-01-01 RX ORDER — VECURONIUM BROMIDE 1 MG/ML
10 INJECTION, POWDER, LYOPHILIZED, FOR SOLUTION INTRAVENOUS ONCE
Status: COMPLETED | OUTPATIENT
Start: 2019-01-01 | End: 2019-01-01

## 2019-01-01 RX ORDER — PHENYLEPHRINE HCL IN 0.9% NACL 0.5 MG/5ML
SYRINGE (ML) INTRAVENOUS
Status: DISCONTINUED
Start: 2019-01-01 | End: 2019-01-01 | Stop reason: HOSPADM

## 2019-01-01 RX ORDER — SODIUM CHLORIDE 9 MG/ML
INJECTION, SOLUTION INTRAVENOUS
Status: ACTIVE
Start: 2019-01-01 | End: 2019-01-01

## 2019-01-01 RX ADMIN — FAMOTIDINE 20 MG: 20 TABLET ORAL at 05:16

## 2019-01-01 RX ADMIN — LACTULOSE 30 ML: 20 SOLUTION ORAL at 11:11

## 2019-01-01 RX ADMIN — FAMOTIDINE 20 MG: 10 INJECTION INTRAVENOUS at 06:00

## 2019-01-01 RX ADMIN — EPOPROSTENOL SODIUM 0.03 MCG/KG/MIN: 1.5 INJECTION, POWDER, LYOPHILIZED, FOR SOLUTION INTRAVENOUS at 00:04

## 2019-01-01 RX ADMIN — FENTANYL CITRATE 100 MCG: 50 INJECTION INTRAMUSCULAR; INTRAVENOUS at 01:13

## 2019-01-01 RX ADMIN — EPOPROSTENOL SODIUM 0.03 MCG/KG/MIN: 1.5 INJECTION, POWDER, LYOPHILIZED, FOR SOLUTION INTRAVENOUS at 16:28

## 2019-01-01 RX ADMIN — ACETAZOLAMIDE 500 MG: 500 INJECTION, POWDER, LYOPHILIZED, FOR SOLUTION INTRAVENOUS at 00:38

## 2019-01-01 RX ADMIN — LACTULOSE 30 ML: 20 SOLUTION ORAL at 13:12

## 2019-01-01 RX ADMIN — PROPOFOL 30 MCG/KG/MIN: 10 INJECTION, EMULSION INTRAVENOUS at 10:39

## 2019-01-01 RX ADMIN — POTASSIUM CHLORIDE 20 MEQ: 200 INJECTION, SOLUTION INTRAVENOUS at 19:59

## 2019-01-01 RX ADMIN — PROPOFOL 10 MCG/KG/MIN: 10 INJECTION, EMULSION INTRAVENOUS at 07:42

## 2019-01-01 RX ADMIN — PROPOFOL 45 MCG/KG/MIN: 10 INJECTION, EMULSION INTRAVENOUS at 06:58

## 2019-01-01 RX ADMIN — FENTANYL CITRATE 25 MCG: 50 INJECTION INTRAMUSCULAR; INTRAVENOUS at 02:30

## 2019-01-01 RX ADMIN — FENTANYL CITRATE 25 MCG: 50 INJECTION INTRAMUSCULAR; INTRAVENOUS at 20:22

## 2019-01-01 RX ADMIN — SODIUM CHLORIDE 500 ML: 9 INJECTION, SOLUTION INTRAVENOUS at 01:19

## 2019-01-01 RX ADMIN — FUROSEMIDE 80 MG: 10 INJECTION, SOLUTION INTRAMUSCULAR; INTRAVENOUS at 13:12

## 2019-01-01 RX ADMIN — PROPOFOL 100 MG: 10 INJECTION, EMULSION INTRAVENOUS at 14:08

## 2019-01-01 RX ADMIN — INSULIN HUMAN 2 UNITS: 100 INJECTION, SOLUTION PARENTERAL at 23:41

## 2019-01-01 RX ADMIN — POTASSIUM CHLORIDE 10 MEQ: 7.46 INJECTION, SOLUTION INTRAVENOUS at 21:54

## 2019-01-01 RX ADMIN — POTASSIUM CHLORIDE 20 MEQ: 200 INJECTION, SOLUTION INTRAVENOUS at 02:24

## 2019-01-01 RX ADMIN — FENTANYL CITRATE 25 MCG: 50 INJECTION INTRAMUSCULAR; INTRAVENOUS at 18:05

## 2019-01-01 RX ADMIN — PROPOFOL 40 MCG/KG/MIN: 10 INJECTION, EMULSION INTRAVENOUS at 01:52

## 2019-01-01 RX ADMIN — INSULIN HUMAN 2 UNITS: 100 INJECTION, SOLUTION PARENTERAL at 17:35

## 2019-01-01 RX ADMIN — AZITHROMYCIN MONOHYDRATE 500 MG: 500 INJECTION, POWDER, LYOPHILIZED, FOR SOLUTION INTRAVENOUS at 06:36

## 2019-01-01 RX ADMIN — EPOPROSTENOL SODIUM 0.03 MCG/KG/MIN: 1.5 INJECTION, POWDER, LYOPHILIZED, FOR SOLUTION INTRAVENOUS at 16:10

## 2019-01-01 RX ADMIN — PROPOFOL 20 MCG/KG/MIN: 10 INJECTION, EMULSION INTRAVENOUS at 00:20

## 2019-01-01 RX ADMIN — ENOXAPARIN SODIUM 40 MG: 100 INJECTION SUBCUTANEOUS at 17:56

## 2019-01-01 RX ADMIN — INSULIN HUMAN 2 UNITS: 100 INJECTION, SOLUTION PARENTERAL at 05:35

## 2019-01-01 RX ADMIN — SENNOSIDES AND DOCUSATE SODIUM 2 TABLET: 8.6; 5 TABLET ORAL at 05:12

## 2019-01-01 RX ADMIN — POTASSIUM CHLORIDE 20 MEQ: 14.9 INJECTION, SOLUTION INTRAVENOUS at 03:58

## 2019-01-01 RX ADMIN — FENTANYL CITRATE 400 MCG/HR: 50 INJECTION, SOLUTION INTRAMUSCULAR; INTRAVENOUS at 06:36

## 2019-01-01 RX ADMIN — SENNOSIDES AND DOCUSATE SODIUM 2 TABLET: 8.6; 5 TABLET ORAL at 17:31

## 2019-01-01 RX ADMIN — FENTANYL CITRATE 100 MCG: 50 INJECTION INTRAMUSCULAR; INTRAVENOUS at 04:00

## 2019-01-01 RX ADMIN — EPOPROSTENOL SODIUM 0.03 MCG/KG/MIN: 1.5 INJECTION, POWDER, LYOPHILIZED, FOR SOLUTION INTRAVENOUS at 12:08

## 2019-01-01 RX ADMIN — FAMOTIDINE 20 MG: 20 TABLET ORAL at 17:56

## 2019-01-01 RX ADMIN — PROPOFOL 30 MCG/KG/MIN: 10 INJECTION, EMULSION INTRAVENOUS at 08:47

## 2019-01-01 RX ADMIN — FENTANYL CITRATE 200 MCG/HR: 50 INJECTION, SOLUTION INTRAMUSCULAR; INTRAVENOUS at 08:41

## 2019-01-01 RX ADMIN — POTASSIUM CHLORIDE 20 MEQ: 14.9 INJECTION, SOLUTION INTRAVENOUS at 15:11

## 2019-01-01 RX ADMIN — CHLOROTHIAZIDE 500 MG: 250 SUSPENSION ORAL at 11:09

## 2019-01-01 RX ADMIN — FUROSEMIDE 10 MG/HR: 10 INJECTION, SOLUTION INTRAMUSCULAR; INTRAVENOUS at 13:37

## 2019-01-01 RX ADMIN — ENOXAPARIN SODIUM 40 MG: 100 INJECTION SUBCUTANEOUS at 05:00

## 2019-01-01 RX ADMIN — FENTANYL CITRATE 100 MCG/HR: 50 INJECTION, SOLUTION INTRAMUSCULAR; INTRAVENOUS at 23:12

## 2019-01-01 RX ADMIN — CEFTRIAXONE SODIUM 2 G: 2 INJECTION, POWDER, FOR SOLUTION INTRAMUSCULAR; INTRAVENOUS at 05:13

## 2019-01-01 RX ADMIN — MAGNESIUM SULFATE IN WATER 2 G: 40 INJECTION, SOLUTION INTRAVENOUS at 00:39

## 2019-01-01 RX ADMIN — POTASSIUM BICARBONATE 50 MEQ: 978 TABLET, EFFERVESCENT ORAL at 13:12

## 2019-01-01 RX ADMIN — LACTULOSE 30 ML: 20 SOLUTION ORAL at 12:05

## 2019-01-01 RX ADMIN — LACTULOSE 30 ML: 20 SOLUTION ORAL at 05:16

## 2019-01-01 RX ADMIN — EPOPROSTENOL SODIUM 0.03 MCG/KG/MIN: 1.5 INJECTION, POWDER, LYOPHILIZED, FOR SOLUTION INTRAVENOUS at 00:53

## 2019-01-01 RX ADMIN — VECURONIUM BROMIDE 10 MG: 10 INJECTION, POWDER, LYOPHILIZED, FOR SOLUTION INTRAVENOUS at 01:28

## 2019-01-01 RX ADMIN — HYDROMORPHONE HYDROCHLORIDE 2 MG: 2 INJECTION INTRAMUSCULAR; INTRAVENOUS; SUBCUTANEOUS at 13:10

## 2019-01-01 RX ADMIN — FENTANYL CITRATE 25 MCG: 50 INJECTION INTRAMUSCULAR; INTRAVENOUS at 15:32

## 2019-01-01 RX ADMIN — FENTANYL CITRATE 50 MCG: 50 INJECTION INTRAMUSCULAR; INTRAVENOUS at 23:00

## 2019-01-01 RX ADMIN — NOREPINEPHRINE BITARTRATE 5 MCG/MIN: 1 INJECTION INTRAVENOUS at 01:10

## 2019-01-01 RX ADMIN — POTASSIUM CHLORIDE 20 MEQ: 200 INJECTION, SOLUTION INTRAVENOUS at 08:57

## 2019-01-01 RX ADMIN — SENNOSIDES AND DOCUSATE SODIUM 2 TABLET: 8.6; 5 TABLET ORAL at 17:56

## 2019-01-01 RX ADMIN — FENTANYL CITRATE 300 MCG/HR: 50 INJECTION, SOLUTION INTRAMUSCULAR; INTRAVENOUS at 21:25

## 2019-01-01 RX ADMIN — POTASSIUM CHLORIDE 20 MEQ: 200 INJECTION, SOLUTION INTRAVENOUS at 08:41

## 2019-01-01 RX ADMIN — EPOPROSTENOL SODIUM 0.03 MCG/KG/MIN: 1.5 INJECTION, POWDER, LYOPHILIZED, FOR SOLUTION INTRAVENOUS at 08:10

## 2019-01-01 RX ADMIN — EPOPROSTENOL SODIUM 0.03 MCG/KG/MIN: 1.5 INJECTION, POWDER, LYOPHILIZED, FOR SOLUTION INTRAVENOUS at 03:28

## 2019-01-01 RX ADMIN — POTASSIUM BICARBONATE 50 MEQ: 978 TABLET, EFFERVESCENT ORAL at 17:31

## 2019-01-01 RX ADMIN — POTASSIUM CHLORIDE 20 MEQ: 14.9 INJECTION, SOLUTION INTRAVENOUS at 09:04

## 2019-01-01 RX ADMIN — POTASSIUM CHLORIDE 10 MEQ: 7.46 INJECTION, SOLUTION INTRAVENOUS at 10:46

## 2019-01-01 RX ADMIN — LACTULOSE 30 ML: 20 SOLUTION ORAL at 17:31

## 2019-01-01 RX ADMIN — SODIUM CHLORIDE 500 ML: 9 INJECTION, SOLUTION INTRAVENOUS at 04:45

## 2019-01-01 RX ADMIN — NOREPINEPHRINE BITARTRATE 2 MCG/MIN: 1 INJECTION INTRAVENOUS at 00:21

## 2019-01-01 RX ADMIN — EPOPROSTENOL SODIUM 0.03 MCG/KG/MIN: 1.5 INJECTION, POWDER, LYOPHILIZED, FOR SOLUTION INTRAVENOUS at 12:02

## 2019-01-01 RX ADMIN — FUROSEMIDE 3 MG/HR: 10 INJECTION, SOLUTION INTRAMUSCULAR; INTRAVENOUS at 00:38

## 2019-01-01 RX ADMIN — ACETAZOLAMIDE 500 MG: 500 INJECTION, POWDER, LYOPHILIZED, FOR SOLUTION INTRAVENOUS at 11:11

## 2019-01-01 RX ADMIN — FAMOTIDINE 20 MG: 10 INJECTION INTRAVENOUS at 00:03

## 2019-01-01 RX ADMIN — EPOPROSTENOL SODIUM 0.03 MCG/KG/MIN: 1.5 INJECTION, POWDER, LYOPHILIZED, FOR SOLUTION INTRAVENOUS at 12:35

## 2019-01-01 RX ADMIN — INSULIN HUMAN 2 UNITS: 100 INJECTION, SOLUTION PARENTERAL at 06:36

## 2019-01-01 RX ADMIN — PROPOFOL 35 MCG/KG/MIN: 10 INJECTION, EMULSION INTRAVENOUS at 05:14

## 2019-01-01 RX ADMIN — EPOPROSTENOL SODIUM 0.03 MCG/KG/MIN: 1.5 INJECTION, POWDER, LYOPHILIZED, FOR SOLUTION INTRAVENOUS at 20:25

## 2019-01-01 RX ADMIN — LACTULOSE 30 ML: 20 SOLUTION ORAL at 17:56

## 2019-01-01 RX ADMIN — ENOXAPARIN SODIUM 40 MG: 100 INJECTION SUBCUTANEOUS at 05:13

## 2019-01-01 RX ADMIN — FUROSEMIDE 8.5 MG/HR: 10 INJECTION, SOLUTION INTRAMUSCULAR; INTRAVENOUS at 03:43

## 2019-01-01 RX ADMIN — MAGNESIUM SULFATE IN WATER 2 G: 40 INJECTION, SOLUTION INTRAVENOUS at 08:42

## 2019-01-01 RX ADMIN — AZITHROMYCIN MONOHYDRATE 500 MG: 500 INJECTION, POWDER, LYOPHILIZED, FOR SOLUTION INTRAVENOUS at 05:59

## 2019-01-01 RX ADMIN — SENNOSIDES AND DOCUSATE SODIUM 2 TABLET: 8.6; 5 TABLET ORAL at 05:15

## 2019-01-01 RX ADMIN — EPOPROSTENOL SODIUM 0.03 MCG/KG/MIN: 1.5 INJECTION, POWDER, LYOPHILIZED, FOR SOLUTION INTRAVENOUS at 08:32

## 2019-01-01 RX ADMIN — FAMOTIDINE 20 MG: 20 TABLET ORAL at 05:00

## 2019-01-01 RX ADMIN — ACETAMINOPHEN 650 MG: 325 TABLET, FILM COATED ORAL at 04:12

## 2019-01-01 RX ADMIN — FAMOTIDINE 20 MG: 20 TABLET ORAL at 17:31

## 2019-01-01 RX ADMIN — ACETAMINOPHEN 650 MG: 325 TABLET, FILM COATED ORAL at 08:00

## 2019-01-01 RX ADMIN — POTASSIUM BICARBONATE 50 MEQ: 978 TABLET, EFFERVESCENT ORAL at 12:05

## 2019-01-01 RX ADMIN — EPOPROSTENOL SODIUM 0.03 MCG/KG/MIN: 1.5 INJECTION, POWDER, LYOPHILIZED, FOR SOLUTION INTRAVENOUS at 20:14

## 2019-01-01 RX ADMIN — LACTULOSE 30 ML: 20 SOLUTION ORAL at 05:00

## 2019-01-01 RX ADMIN — FUROSEMIDE 60 MG: 10 INJECTION, SOLUTION INTRAMUSCULAR; INTRAVENOUS at 12:05

## 2019-01-01 RX ADMIN — POTASSIUM CHLORIDE 20 MEQ: 14.9 INJECTION, SOLUTION INTRAVENOUS at 21:54

## 2019-01-01 RX ADMIN — ENOXAPARIN SODIUM 40 MG: 100 INJECTION SUBCUTANEOUS at 17:31

## 2019-01-01 RX ADMIN — CEFTRIAXONE SODIUM 2 G: 2 INJECTION, POWDER, FOR SOLUTION INTRAMUSCULAR; INTRAVENOUS at 05:21

## 2019-01-01 RX ADMIN — PROPOFOL 100 MG: 10 INJECTION, EMULSION INTRAVENOUS at 14:26

## 2019-01-01 RX ADMIN — PROPOFOL 25 MCG/KG/MIN: 10 INJECTION, EMULSION INTRAVENOUS at 01:21

## 2019-01-01 RX ADMIN — EPOPROSTENOL SODIUM 0.03 MCG/KG/MIN: 1.5 INJECTION, POWDER, LYOPHILIZED, FOR SOLUTION INTRAVENOUS at 16:53

## 2019-01-01 RX ADMIN — POTASSIUM CHLORIDE 20 MEQ: 200 INJECTION, SOLUTION INTRAVENOUS at 12:17

## 2019-01-01 RX ADMIN — SENNOSIDES AND DOCUSATE SODIUM 2 TABLET: 8.6; 5 TABLET ORAL at 18:19

## 2019-01-01 RX ADMIN — EPOPROSTENOL SODIUM 0.03 MCG/KG/MIN: 1.5 INJECTION, POWDER, LYOPHILIZED, FOR SOLUTION INTRAVENOUS at 08:48

## 2019-01-01 RX ADMIN — HYDROMORPHONE HYDROCHLORIDE 2 MG: 2 INJECTION INTRAMUSCULAR; INTRAVENOUS; SUBCUTANEOUS at 12:40

## 2019-01-01 RX ADMIN — POTASSIUM BICARBONATE 50 MEQ: 978 TABLET, EFFERVESCENT ORAL at 05:00

## 2019-01-01 RX ADMIN — FAMOTIDINE 20 MG: 20 TABLET ORAL at 18:18

## 2019-01-01 RX ADMIN — CHLOROTHIAZIDE SODIUM 500 MG: 500 INJECTION, POWDER, LYOPHILIZED, FOR SOLUTION INTRAVENOUS at 05:26

## 2019-01-01 RX ADMIN — FENTANYL CITRATE 200 MCG/HR: 50 INJECTION, SOLUTION INTRAMUSCULAR; INTRAVENOUS at 15:48

## 2019-01-01 RX ADMIN — PROPOFOL 35 MCG/KG/MIN: 10 INJECTION, EMULSION INTRAVENOUS at 12:00

## 2019-01-01 RX ADMIN — FENTANYL CITRATE 25 MCG: 50 INJECTION INTRAMUSCULAR; INTRAVENOUS at 19:05

## 2019-01-01 RX ADMIN — LORAZEPAM 2 MG: 2 INJECTION INTRAMUSCULAR; INTRAVENOUS at 13:24

## 2019-01-01 RX ADMIN — MAGNESIUM SULFATE IN WATER 2 G: 40 INJECTION, SOLUTION INTRAVENOUS at 07:31

## 2019-01-01 RX ADMIN — EPOPROSTENOL SODIUM 0.03 MCG/KG/MIN: 1.5 INJECTION, POWDER, LYOPHILIZED, FOR SOLUTION INTRAVENOUS at 04:26

## 2019-01-01 RX ADMIN — FENTANYL CITRATE 100 MCG: 50 INJECTION INTRAMUSCULAR; INTRAVENOUS at 04:40

## 2019-01-01 RX ADMIN — PROPOFOL 30 MCG/KG/MIN: 10 INJECTION, EMULSION INTRAVENOUS at 05:57

## 2019-01-01 RX ADMIN — INSULIN HUMAN 2 UNITS: 100 INJECTION, SOLUTION PARENTERAL at 05:01

## 2019-01-01 RX ADMIN — CEFTRIAXONE SODIUM 2 G: 2 INJECTION, POWDER, FOR SOLUTION INTRAMUSCULAR; INTRAVENOUS at 05:59

## 2019-01-01 RX ADMIN — SODIUM CHLORIDE: 9 INJECTION, SOLUTION INTRAVENOUS at 10:45

## 2019-01-01 RX ADMIN — PROPOFOL 40 MCG/KG/MIN: 10 INJECTION, EMULSION INTRAVENOUS at 09:13

## 2019-01-01 RX ADMIN — FENTANYL CITRATE 100 MCG: 50 INJECTION INTRAMUSCULAR; INTRAVENOUS at 10:53

## 2019-01-01 RX ADMIN — PROPOFOL 80 MCG/KG/MIN: 10 INJECTION, EMULSION INTRAVENOUS at 23:05

## 2019-01-01 RX ADMIN — EPOPROSTENOL SODIUM 0.03 MCG/KG/MIN: 1.5 INJECTION, POWDER, LYOPHILIZED, FOR SOLUTION INTRAVENOUS at 03:53

## 2019-01-01 RX ADMIN — ENOXAPARIN SODIUM 40 MG: 100 INJECTION SUBCUTANEOUS at 05:17

## 2019-01-01 RX ADMIN — POTASSIUM CHLORIDE 20 MEQ: 14.9 INJECTION, SOLUTION INTRAVENOUS at 22:44

## 2019-01-01 RX ADMIN — EPOPROSTENOL SODIUM 0.03 MCG/KG/MIN: 1.5 INJECTION, POWDER, LYOPHILIZED, FOR SOLUTION INTRAVENOUS at 04:06

## 2019-01-01 RX ADMIN — AZITHROMYCIN MONOHYDRATE 500 MG: 500 INJECTION, POWDER, LYOPHILIZED, FOR SOLUTION INTRAVENOUS at 05:13

## 2019-01-01 RX ADMIN — EPOPROSTENOL SODIUM 0.03 MCG/KG/MIN: 1.5 INJECTION, POWDER, LYOPHILIZED, FOR SOLUTION INTRAVENOUS at 20:22

## 2019-01-01 RX ADMIN — FENTANYL CITRATE 400 MCG/HR: 50 INJECTION, SOLUTION INTRAMUSCULAR; INTRAVENOUS at 13:12

## 2019-01-01 RX ADMIN — FENTANYL CITRATE 100 MCG/HR: 50 INJECTION, SOLUTION INTRAMUSCULAR; INTRAVENOUS at 00:05

## 2019-01-01 RX ADMIN — POTASSIUM CHLORIDE 20 MEQ: 14.9 INJECTION, SOLUTION INTRAVENOUS at 03:43

## 2019-01-01 RX ADMIN — FENTANYL CITRATE 400 MCG/HR: 50 INJECTION, SOLUTION INTRAMUSCULAR; INTRAVENOUS at 23:46

## 2019-01-01 RX ADMIN — EPOPROSTENOL SODIUM 0.03 MCG/KG/MIN: 1.5 INJECTION, POWDER, LYOPHILIZED, FOR SOLUTION INTRAVENOUS at 07:49

## 2019-01-01 RX ADMIN — POTASSIUM CHLORIDE 20 MEQ: 14.9 INJECTION, SOLUTION INTRAVENOUS at 12:13

## 2019-01-01 RX ADMIN — EPOPROSTENOL SODIUM 0.03 MCG/KG/MIN: 1.5 INJECTION, POWDER, LYOPHILIZED, FOR SOLUTION INTRAVENOUS at 00:12

## 2019-01-01 RX ADMIN — FAMOTIDINE 20 MG: 20 TABLET ORAL at 05:13

## 2019-01-01 RX ADMIN — HYDROMORPHONE HYDROCHLORIDE 2 MG: 2 INJECTION INTRAMUSCULAR; INTRAVENOUS; SUBCUTANEOUS at 13:34

## 2019-01-01 RX ADMIN — LORAZEPAM 2 MG: 2 INJECTION INTRAMUSCULAR; INTRAVENOUS at 12:40

## 2019-01-01 RX ADMIN — FUROSEMIDE 8 MG/HR: 10 INJECTION, SOLUTION INTRAMUSCULAR; INTRAVENOUS at 00:11

## 2019-01-01 RX ADMIN — ENOXAPARIN SODIUM 40 MG: 100 INJECTION SUBCUTANEOUS at 06:01

## 2019-03-15 PROBLEM — J18.9 PNEUMONIA DUE TO INFECTIOUS ORGANISM: Status: ACTIVE | Noted: 2019-01-01

## 2019-03-15 PROBLEM — E83.42 HYPOMAGNESEMIA: Status: ACTIVE | Noted: 2019-01-01

## 2019-03-15 PROBLEM — R73.9 HYPERGLYCEMIA: Status: ACTIVE | Noted: 2019-01-01

## 2019-03-15 PROBLEM — J96.01 ACUTE HYPOXEMIC RESPIRATORY FAILURE (HCC): Status: ACTIVE | Noted: 2019-01-01

## 2019-03-15 PROBLEM — R60.9 EDEMA: Status: ACTIVE | Noted: 2019-01-01

## 2019-03-15 PROBLEM — G93.40 ACUTE ENCEPHALOPATHY: Status: ACTIVE | Noted: 2019-01-01

## 2019-03-15 PROBLEM — D45 POLYCYTHEMIA VERA (HCC): Status: ACTIVE | Noted: 2019-01-01

## 2019-03-15 PROBLEM — E66.01 CLASS 3 SEVERE OBESITY DUE TO EXCESS CALORIES WITH SERIOUS COMORBIDITY AND BODY MASS INDEX (BMI) OF 50.0 TO 59.9 IN ADULT (HCC): Status: ACTIVE | Noted: 2019-01-01

## 2019-03-15 PROBLEM — E87.0 HYPERNATREMIA: Status: ACTIVE | Noted: 2019-01-01

## 2019-03-15 PROBLEM — I24.89 DEMAND ISCHEMIA (HCC): Status: ACTIVE | Noted: 2019-01-01

## 2019-03-15 PROBLEM — I50.23 ACUTE ON CHRONIC SYSTOLIC CONGESTIVE HEART FAILURE (HCC): Status: ACTIVE | Noted: 2019-01-01

## 2019-03-16 PROBLEM — E11.65 TYPE 2 DIABETES MELLITUS WITH HYPERGLYCEMIA, WITHOUT LONG-TERM CURRENT USE OF INSULIN (HCC): Status: ACTIVE | Noted: 2019-01-01

## 2019-03-16 PROBLEM — E87.6 HYPOKALEMIA: Status: ACTIVE | Noted: 2019-01-01

## 2019-03-16 NOTE — ASSESSMENT & PLAN NOTE
Forced diuresis and monitor  Patient on max ventilator of 100% and peep of 18, flolan     Will aggressive force diuresis with diuril, lasix goal net negative 4L for 3/19    Net negative goal met with negative fluid balance is on some pressor with propofol creatine stable.   Will check ScVO2 and ABG to monitor for over diuresis and loss of SV.   Continues to be warm and wet

## 2019-03-16 NOTE — ASSESSMENT & PLAN NOTE
Metabolic encephalopathy  Sedation vacation as tolerates  CNS infection considered unlikely given improvement in encephalopathy and no meningismus on exam    Keep patient awake during the day and avoid daytime naps. Remove all unnecessary lines (central lines, peripheral IVs, feeding tubes, cohn catheters). Avoid polypharmacy, frequent re-orientation, maximize family time at bedside, use glasses and hearing aids if needed, treat pain, encourage ambulation, minimize benzos/anticholinergic agents.     Patient on Fentanyl gtt of 400 mcg will continue to wean since he will have large lipid stores for days. Patient wasn't triggering vent yesterday.    Patient was titrated to 200mcg gtt this am on some propofol since he was biting through ET tube.

## 2019-03-16 NOTE — H&P
Hospital Medicine History & Physical Note    Date of Service  3/16/2019    Primary Care Physician  Rich Reyes P.A.-C.    Consultants  Critical care.     Code Status  full    Chief Complaint  confusion    History of Presenting Illness  48 y.o. male who presented 3/15/2019 with altered mental status to [facility]. Mr. Camacho has a past medical history of diabetes mellitus that recently developed worsening low back pain and was not able to work any further thus, according to his daughter, had been not leaving the house much and was gaining substantial amounts of weight.  On 3/12/2019, his daughter noticed that he was confused, repetitive, and slurring his speech therefore 911 was called.  He was brought to the emergency room at [facility] where he underwent a workup and was found to have significant hypoxia with oxygen saturations down to 60% range and required intubation.  Due to his confusion, a CAT scan head was done and was negative.  There was consideration of a lumbar puncture but this was not feasible given multiple constraints as noted below.  The decision was made to transfer him here for higher level of care and critical care consultation on the ventilator.   While at [facility], he was found to have an ejection fraction 25% and cardiology was consulted.  This morning, I met with both of his daughters at bedside discussed his condition in great length.  They understand the severity of situation and are optimistic about his chances of recovery.    Review of Systems  Review of Systems   Unable to perform ROS: Intubated       Past Medical History  Diabetes, cardiomyopathy with EF 25%    Surgical History  No cardiac surgeries.      Family History  family history includes Cancer in his father and mother.     Social History   reports that he has never smoked. He has never used smokeless tobacco. He reports that he drinks alcohol. He reports that he  does not use drugs.he lives in [place]    Allergies  No Known Allergies    Medications  Prior to Admission Medications   Prescriptions Last Dose Informant Patient Reported? Taking?   acetaminophen (TYLENOL) 500 MG Tab   Yes No   Sig: Take 1,000 mg by mouth every 6 hours as needed.      Facility-Administered Medications: None       Physical Exam  Temp:  [35.4 °C (95.7 °F)-38.3 °C (100.9 °F)] 38.1 °C (100.6 °F)  Pulse:  [] 98  Resp:  [11-39] 33  SpO2:  [84 %-100 %] 97 %    Physical Exam   Constitutional: No distress.   obese   Neck:   Right sided central line  Large circumference of the neck   Cardiovascular:   Distant heart sounds, sinus rhythm   Pulmonary/Chest:   Vent, somewhat poor air movement with a few crackles.   No wheezing.    Abdominal: He exhibits distension. There is no tenderness.   Genitourinary:   Genitourinary Comments: Arnold catheter   Musculoskeletal: He exhibits edema.   Neurological:   Sedated, he does not follow commands on sedation   Skin: No rash noted. He is not diaphoretic.   Nursing note and vitals reviewed.      Laboratory:  Recent Labs      03/15/19   0510  03/15/19   2300  03/16/19   0459   WBC  10.3  7.7  11.1*   RBC  5.97  6.21*  6.07   HEMOGLOBIN  17.7  18.5*  18.5*   HEMATOCRIT  54.7*  58.8*  58.1*   MCV  91.6  94.7  95.7   MCH  29.6  29.8  30.5   MCHC  32.4*  31.5*  31.8*   RDW  15.7*  55.3*  55.6*   PLATELETCT  214  193  219   MPV  10.6*  10.5  11.1     Recent Labs      03/15/19   0510  03/15/19   2300  03/16/19   0651   SODIUM  146*  146*  148*   POTASSIUM  3.5  3.2*  3.4*   CHLORIDE  105  102  101   CO2  34*  35*  36*   GLUCOSE  150*  119*  135*   BUN  22*  22  23*   CREATININE  1.2  1.17  1.22   CALCIUM  8.7  8.3*  8.5     Recent Labs      03/14/19   0500  03/15/19   0510  03/15/19   2300 03/16/19   0651   ALTSGPT  27  20  12   --    ASTSGOT  30  40*  21   --    ALKPHOSPHAT  68  64  57   --    TBILIRUBIN  1.7*  1.5*  1.2   --    PREALBUMIN   --    --   7.0*   --     GLUCOSE  163*  150*  119*  135*     Recent Labs      03/15/19   2300   APTT  30.8   INR  1.33*         Recent Labs      03/15/19   0510  03/15/19   2300   TRIGLYCERIDE  132  174*     Recent Labs      03/13/19   1200   TROPONINI  0.19*       Urinalysis:    No results found     Imaging:  DX-ABDOMEN FOR TUBE PLACEMENT   Final Result      Enteric tube has been placed and the tip projects over the distal stomach.      DX-CHEST-PORTABLE (1 VIEW)   Final Result      Bibasilar atelectasis or consolidation with small effusions.      DX-CHEST-PORTABLE (1 VIEW)   Final Result      Tube and line as described above.      Prominent mediastinum and cardiac silhouette possibly related to patient body habitus.      Bibasal atelectasis and possible trace effusions without evidence of pneumothorax.            Assessment/Plan:  I anticipate this patient will require at least two midnights for appropriate medical management, necessitating inpatient admission.    Acute on chronic systolic congestive heart failure (HCC)- (present on admission)   Assessment & Plan    With poor ejection fraction  He had undergone aggressive diuresis at [facility] prior to transfer  He is not a candidate for an ACE inhibitor nor beta blocker due to hypotension  IV lasix drip  Echocardiogram:  Left Ventricle  Poor endocardial definition.  Cannot evaluate LV size and thickness.   Severely reduced left ventricular systolic function. Left ventricular   ejection fraction is visually estimated to be 25%. Global hypokinesis.   Wall motion is difficult to assess with the limitations of image   quality. Indeterminate diastolic function.    Right Ventricle  Severely dilated right ventricle. Reduced right ventricular systolic   function.    Right Atrium  Markedly enlarged right atrium. Dilated inferior vena cava without   inspiratory collapse.    Left Atrium  The left atrium is normal in size.  Left atrial volume index is 22    mL/sq m.    Mitral  Valve  Structurally normal mitral valve without significant stenosis or   reguritaiton.    Aortic Valve  Aortic valve is poorly seen, unable to obtain AV cusp doppler.    Tricuspid Valve  The tricuspid valve is poorly seen.Trace tricuspid regurgitation.unable   to estimate pulmonary artery pressure due to an inadequate tricuspid   regurgitant jet.    Pulmonic Valve  The pulmonic valve is not well visualized. Mild pulmonic insufficiency.    Pericardium  Pericardium not well visualized.  No pericardial effusion noted in   views obtained.    Aorta  The aortic root is poorly seen.     Acute hypoxemic respiratory failure (HCC)- (present on admission)   Assessment & Plan    Requiring endotracheal intubation on 3/12  Critical Care has consulted.  He is on a continuous Flolan infusion.  His FiO2 remains high at 80%  Likely secondary to a combination of heart failure and pneumonia in the setting of restrictive lung process secondary to habitus     Edema- (present on admission)   Assessment & Plan    With volume overload     Class 3 severe obesity due to excess calories with serious comorbidity and body mass index (BMI) of 50.0 to 59.9 in adult ([facility])- (present on admission)   Assessment & Plan    BMI is 56     Pneumonia due to infectious organism- (present on admission)   Assessment & Plan    With associated respiratory failure  Continue IV rocephin and azithromycin.   Cultures from admit were negative.      Demand ischemia ([facility])- (present on admission)   Assessment & Plan    Troponin had gone up to 0.28 and trended down     Acute encephalopathy- (present on admission)   Assessment & Plan    This began prior to admission with confusion and slurring his speech per his daughters,  CT head was negative on admit  He was not a candidate for a lumbar puncture due to mechanical ventilation and habitus.  Consider hypercarbia       Type 2 diabetes mellitus with hyperglycemia, without long-term current use of insulin ([facility])- (present on  admission)   Assessment & Plan    HbA1c is 9.2  Sliding scale insulin and diabetic tube feeding     Hypernatremia- (present on admission)   Assessment & Plan    Sodium is 146 on admit  Free water via cortrak and follow BMP         VTE prophylaxis: lovenox

## 2019-03-16 NOTE — ASSESSMENT & PLAN NOTE
Decompensated, biventricular failure  Cardiology consultation feels this is endstage heart failure.   Continue forced diuresis with Lasix infusion/diuril  Inhaled Flolan for right ventricle afterload reduction  Blood pressure does not allow for beta-blocker or ACE inhibitor at this time  No longer requiring inotropic support with levophed  Cardiology consulted, no interventions planned at this time

## 2019-03-16 NOTE — CARE PLAN
Problem: Respiratory:  Goal: Respiratory status will improve  Outcome: PROGRESSING SLOWER THAN EXPECTED  Patient vent settings switched from APV/CMV to APRV.   Intervention: Assess and monitor pulmonary status  Completed and documented.  Intervention: Administer and titrate oxygen therapy  Completed.  Intervention: Educate and encourage coughing and deep breathing  Not applicable

## 2019-03-16 NOTE — CARE PLAN
Problem: Ventilation Defect:  Goal: Ability to achieve and maintain unassisted ventilation or tolerate decreased levels of ventilator support  22Adult Ventilation Update    Total Vent Days: 2    Patient Lines/Drains/Airways Status    Active Airway     Name: Placement date: Placement time: Site: Days:    Airway ETT Oral 8.0 03/13/19   0010   Oral   3                    lateau Pressure (Q Shift): 19 (03/15/19 2310)  Static Compliance (ml / cm H2O): 42 (03/16/19 0315)    Patient failed trials because of    Barriers to SBT  New admit on APRV and Flolan, not a wean candidate at this time               Mobility  Level of Mobility: Level IV (03/16/19 0400)  Activity Performed: Unable to mobilize (03/16/19 0400)  Reason Not Mobilized: Unstable condition (03/16/19 0400)  Mobilization Comments: Desats with attempts to mobilize/APRV. (03/16/19 0400)    Events/Summary/Plan:    Not a wean candidate at this time. Continue Flolan and supporting respiratory status.

## 2019-03-16 NOTE — ASSESSMENT & PLAN NOTE
No healthcare associated risk factors prior to admission  Continue Rocephin and azithromycin for a 5-day total course of antibiotics stopping today 3/18  Bronchoscopy with BAL, follow-up on cultures  pro-calcitonin elevated -> will recheck 3/18 blood cultures overnight and u/a sent may need to boarden spectrum of antibiotics.   UA at outside hospital unremarkable  Continues to be febrile ? Source (ct chest abdomen pelvis preformed 3/12 ? Cellulitis vs anasarca to abdominal wall this is consistent with anasarca.)    Will watch closely off antibiotics today last day 3/18    Discussed with ID for consult today.

## 2019-03-16 NOTE — ASSESSMENT & PLAN NOTE
Intubated outside facility on March 12, 2019  RT/O2 protocols  Daily and PRN ABGs  Titration of ventilator therapy based on ABGs and patient's status  Sedation as tolerated/indicated  Daily CXR  HOB >30 degrees and peridex for VAP prevention  Pepcid for GI prophylaxis  SAT/SBT when able (ABCDEF Bundle)  Early mobility when safe  Now on APRV with improved oxygenation with complete white out of right lung. --> will do bronchoscopy to rule out mucous plugging and obtain BAL sample  Put in more upright position for blood flow to bases for matching  Repeat Echo with bubble study rule out cardiac shunt vs extracardiac.   Lower ext doppler negative for dvt CTA chest negative for PE.

## 2019-03-16 NOTE — CONSULTS
Critical Care Consultation    Date of consult: 3/15/2019    Referring Physician  Fredi Mckeon M.D.    Reason for Consultation  Ventilator dependent respiratory failure, congestive heart failure    History of Presenting Illness  48 y.o. male who presented 3/15/2019 with past medical history significant for hypertension and morbid obesity who was admitted to [facility] on 3/12 for altered mental status times 24 hours.  He was slurring his words.  Daughter stated that patient had been depressed for a few months and had stopped taking care of himself.  He had recently been diagnosed with diabetes but lost his insurance.  He had about a 50 pound weight gain with lower extremity edema. In the emergency department he was altered and hypoxic with oxygen saturation of 60%.  He underwent CT imaging of his chest/abdomen/pelvis and was admitted to the hospital.  He developed hypercarbic respiratory failure and was intubated with some difficulty.  His hospital course was complicated with sedation issues trialing Precedex as well as propofol and fentanyl and pushes of Ativan.  A tox screen was negative and he was transiently on antibiotics including antiviral for possible encephalitis.  He has remained on vancomycin and Zosyn since then.  Blood cultures from admission date are negative. He was started on a dopamine drip transiently after an echocardiogram was performed revealed an ejection fraction of 25% with severely dilated right ventricle as well.  He was aggressively diuresed with a negative fluid loss of 11 L since admission but started to have increasing in serum bicarbonate levels as well as his pH.  He was seen by cardiology but given lack of pulmonary and critical care coverage decision was made to transfer patient to [facility] for higher level of care.  In route to the hospital patient's PEEP had to be increased from 10-12 given persistent desaturations.    Code Status  Prior    Review of  Systems  Review of Systems   Unable to perform ROS: Intubated       Past Medical History   has a past medical history of Anxiety; Depression; Hypertension; Insomnia; and No known health problems.    Surgical History   has no past surgical history on file.    Family History  family history includes Cancer in his father and mother.    Social History   reports that he has never smoked. He has never used smokeless tobacco. He reports that he drinks alcohol. He reports that he does not use drugs.    Medications  Home Medications    **Home medications have not yet been reviewed for this encounter**       Current Facility-Administered Medications   Medication Dose Route Frequency Provider Last Rate Last Dose   • Respiratory Care per Protocol   Nebulization Continuous RT Jeremy M Gonda, M.D.       • famotidine (PEPCID) tablet 20 mg  20 mg Oral Q12HRS Jeremy M Gonda, M.D.        Or   • famotidine (PEPCID) injection 20 mg  20 mg Intravenous Q12HRS Jeremy M Gonda, M.D.       • senna-docusate (PERICOLACE or SENOKOT S) 8.6-50 MG per tablet 2 Tab  2 Tab Oral BID Jeremy M Gonda, M.D.        And   • polyethylene glycol/lytes (MIRALAX) PACKET 1 Packet  1 Packet Oral QDAY PRN Jeremy M Gonda, M.D.        And   • magnesium hydroxide (MILK OF MAGNESIA) suspension 30 mL  30 mL Oral QDAY PRN Jeremy M Gonda, M.D.        And   • bisacodyl (DULCOLAX) suppository 10 mg  10 mg Rectal QDAY PRN Jeremy M Gonda, M.D.       • MD Alert...ICU Electrolyte Replacement per Pharmacy   Other PHARMACY TO DOSE Jeremy M Gonda, M.D.       • Pharmacy Consult: Enteral tube insertion - review meds/change route/product selection  1 Each Other PHARMACY TO DOSE Jeremy M Gonda, M.D.       • [START ON 3/16/2019] enoxaparin (LOVENOX) inj 40 mg  40 mg Subcutaneous DAILY Jeremy M Gonda, M.D.       • fentaNYL (SUBLIMAZE) injection 25 mcg  25 mcg Intravenous Q HOUR PRN Jeremy M Gonda, M.D.        Or   • fentaNYL (SUBLIMAZE) injection 50 mcg  50 mcg Intravenous Q HOUR PRN  Jeremy M Gonda, M.D.        Or   • fentaNYL (SUBLIMAZE) injection 100 mcg  100 mcg Intravenous Q HOUR PRN Jeremy M Gonda, M.D.       • fentaNYL (SUBLIMAZE) 50 mcg/mL in 50mL   Intravenous Continuous Jeremy M Gonda, M.D.       • ipratropium-albuterol (DUONEB) nebulizer solution  3 mL Nebulization Q2HRS PRN (RT) Jeremy M Gonda, M.D.       • propofol (DIPRIVAN) injection  0-80 mcg/kg/min Intravenous Continuous Jeremy M Gonda, M.D.           Allergies  No Known Allergies    Vital Signs last 24 hours  SpO2:  [91 %] 91 %    Physical Exam  Physical Exam   Constitutional: He appears well-developed and well-nourished.  Non-toxic appearance. He has a sickly appearance. He appears ill. He is sedated and intubated.   HENT:   Head: Normocephalic and atraumatic.   Nose: Nose normal.   Mouth/Throat: Oropharynx is clear and moist.   Endotracheal tube and orogastric tube in position without evidence of intraoral trauma   Eyes: Pupils are equal, round, and reactive to light. Conjunctivae are normal. No scleral icterus.   Pupils are 1-2 mm and reactive bilaterally   Neck: Neck supple. No JVD present. No tracheal deviation present.   Right IJ central venous catheter at 14 cm at the neck without surrounding hematoma, enlarged neck circumference   Cardiovascular: Regular rhythm and intact distal pulses.   Occasional extrasystoles are present. Tachycardia present.  PMI is displaced.  Exam reveals distant heart sounds.    No murmur heard.  Pulmonary/Chest: No accessory muscle usage. No tachypnea. He is intubated. He has decreased breath sounds. He has no wheezes. He has rales in the right middle field, the right lower field, the left middle field and the left lower field.   Peak inspiratory pressure 28-30   Abdominal: Soft. Bowel sounds are normal. He exhibits no distension. There is no tenderness. There is no rebound.   Pitting edema of abdominal wall, mild overlying erythema   Genitourinary: Penis normal.   Genitourinary Comments: Arnold  catheter in place   Musculoskeletal: He exhibits edema (2+ bilateral lower extremity). He exhibits no deformity.   Neurological: He is unresponsive.   Sedated on a propofol and fentanyl drip, minimally responsive to pain   Skin: Skin is warm and dry. He is not diaphoretic. No pallor.   Psychiatric:   Unable to assess given current clinical condition   Nursing note and vitals reviewed.      Fluids  No intake or output data in the 24 hours ending 03/15/19 2301    Laboratory  Recent Results (from the past 48 hour(s))   VANCOMYCIN TROUGH    Collection Time: 03/14/19 12:25 AM   Result Value Ref Range    Vancomycin Trough 27.3 (HH) 10.0 - 20.0 ug/mL   POC GLUCOSE    Collection Time: 03/14/19  3:01 AM   Result Value Ref Range    Glucose - Accu-Ck 151 (A) 70 - 100 mg/dL   ARTERIAL BLOOD GAS    Collection Time: 03/14/19  5:00 AM   Result Value Ref Range    Ph 7.43 7.35 - 7.45    Pco2 47 (H) 35 - 45 mmHg    Po2 69 65 - 85 mmHg    O2 Saturation 92.0 89.0 - 94.0 %    Hco3 30.7 (H) 20.0 - 29.0 mmol/L    Base Excess 5.1 (H) -3.0 - 4.0 mmol/L   CBC WITH DIFFERENTIAL    Collection Time: 03/14/19  5:00 AM   Result Value Ref Range    WBC 10.5 4.8 - 10.8 K/uL    RBC 6.40 (H) 4.70 - 6.10 M/uL    Hemoglobin 19.0 (H) 14.0 - 18.0 g/dL    Hematocrit 59.2 (H) 42.0 - 52.0 %    MCV 92.5 80.0 - 94.0 fL    MCH 29.7 27.0 - 31.0 pg    MCHC 32.1 (L) 33.0 - 37.0 g/dL    RDW 17.1 (H) 11.5 - 14.5 %    Platelet Count 202 130 - 400 K/uL    MPV 10.4 7.4 - 10.4 fL    Neutrophils Automated 72.9 (H) 39.0 - 70.0 %    Lymphocytes Automated 15.3 (L) 21.0 - 50.0 %    Monocytes Automated 10.1 (H) 2.0 - 9.0 %    Eosinophils Automated 0.6 0.0 - 5.0 %    Basophils Automated 0.8 0.0 - 3.0 %    Abs Neutrophils Automated 7.6 1.8 - 7.7 K/uL    Abs Lymph Automated 1.6 1.2 - 4.8 K/uL    Eosinophil Count, Blood 0.06 0.00 - 0.50 K/uL   COMP METABOLIC PANEL    Collection Time: 03/14/19  5:00 AM   Result Value Ref Range    Sodium 145 136 - 145 mmol/L    Potassium 4.2 3.5 -  5.1 mmol/L    Chloride 106 98 - 107 mmol/L    Co2 30 21 - 32 mmol/L    Anion Gap 13 10 - 18 mmol/L    Glucose 163 (H) 74 - 99 mg/dL    Bun 30 (H) 7 - 18 mg/dL    Creatinine 1.6 (H) 0.8 - 1.3 mg/dL    Calcium 8.6 8.5 - 11.0 mg/dL    AST(SGOT) 30 15 - 37 U/L    ALT(SGPT) 27 12 - 78 U/L    Alkaline Phosphatase 68 46 - 116 U/L    Total Bilirubin 1.7 (H) 0.2 - 1.0 mg/dL    Albumin 2.0 (L) 3.4 - 5.0 g/dL    Total Protein 5.5 (L) 6.4 - 8.2 g/dL    A-G Ratio 0.6    ESTIMATED GFR    Collection Time: 03/14/19  5:00 AM   Result Value Ref Range    GFR If  55 (A) >60 mL/min/1.73 m 2    GFR If Non  46 (A) >60 mL/min/1.73 m 2   POC GLUCOSE    Collection Time: 03/14/19 12:20 PM   Result Value Ref Range    Glucose - Accu-Ck 165 (A) 70 - 100 mg/dL   POC GLUCOSE    Collection Time: 03/14/19 12:29 PM   Result Value Ref Range    Glucose - Accu-Ck 165 (A) 70 - 100 mg/dL   MRSA BY PCR (AMP)    Collection Time: 03/14/19  6:05 PM   Result Value Ref Range    MRSA PCR Negative    POC GLUCOSE    Collection Time: 03/14/19  6:15 PM   Result Value Ref Range    Glucose - Accu-Ck 131 (A) 70 - 100 mg/dL   PHOSPHORUS    Collection Time: 03/14/19  8:45 PM   Result Value Ref Range    Phosphorus 3.7 2.6 - 4.7 mg/dL   MAGNESIUM    Collection Time: 03/14/19  8:45 PM   Result Value Ref Range    Magnesium 1.5 (L) 1.8 - 2.4 mg/dL   CBC WITH DIFFERENTIAL    Collection Time: 03/15/19  5:10 AM   Result Value Ref Range    WBC 10.3 4.8 - 10.8 K/uL    RBC 5.97 4.70 - 6.10 M/uL    Hemoglobin 17.7 14.0 - 18.0 g/dL    Hematocrit 54.7 (H) 42.0 - 52.0 %    MCV 91.6 80.0 - 94.0 fL    MCH 29.6 27.0 - 31.0 pg    MCHC 32.4 (L) 33.0 - 37.0 g/dL    RDW 15.7 (H) 11.5 - 14.5 %    Platelet Count 214 130 - 400 K/uL    MPV 10.6 (H) 7.4 - 10.4 fL    Neutrophils Automated 81.7 (H) 39.0 - 70.0 %    Lymphocytes Automated 8.1 (L) 21.0 - 50.0 %    Monocytes Automated 8.8 2.0 - 9.0 %    Eosinophils Automated 0.4 0.0 - 5.0 %    Basophils Automated 0.5 0.0  - 3.0 %    Abs Neutrophils Automated 8.4 (H) 1.8 - 7.7 K/uL    Abs Lymph Automated 0.8 (L) 1.2 - 4.8 K/uL    Eosinophil Count, Blood 0.04 0.00 - 0.50 K/uL   COMP METABOLIC PANEL    Collection Time: 03/15/19  5:10 AM   Result Value Ref Range    Sodium 146 (H) 136 - 145 mmol/L    Potassium 3.5 3.5 - 5.1 mmol/L    Chloride 105 98 - 107 mmol/L    Co2 34 (H) 21 - 32 mmol/L    Anion Gap 11 10 - 18 mmol/L    Glucose 150 (H) 74 - 99 mg/dL    Bun 22 (H) 7 - 18 mg/dL    Creatinine 1.2 0.8 - 1.3 mg/dL    Calcium 8.7 8.5 - 11.0 mg/dL    AST(SGOT) 40 (H) 15 - 37 U/L    ALT(SGPT) 20 12 - 78 U/L    Alkaline Phosphatase 64 46 - 116 U/L    Total Bilirubin 1.5 (H) 0.2 - 1.0 mg/dL    Albumin 1.8 (L) 3.4 - 5.0 g/dL    Total Protein 5.5 (L) 6.4 - 8.2 g/dL    A-G Ratio 0.5    MAGNESIUM    Collection Time: 03/15/19  5:10 AM   Result Value Ref Range    Magnesium 1.7 (L) 1.8 - 2.4 mg/dL   ESTIMATED GFR    Collection Time: 03/15/19  5:10 AM   Result Value Ref Range    GFR If African American >60 >60 mL/min/1.73 m 2    GFR If Non African American >60 >60 mL/min/1.73 m 2   ARTERIAL BLOOD GAS    Collection Time: 03/15/19  5:10 AM   Result Value Ref Range    Ph 7.54 (H) 7.35 - 7.45    Pco2 41 35 - 45 mmHg    Po2 52 (L) 65 - 85 mmHg    O2 Saturation 87.0 (L) 89.0 - 94.0 %    Hco3 34.3 (H) 20.0 - 29.0 mmol/L    Base Excess 10.4 (H) -3.0 - 4.0 mmol/L   Triglyceride    Collection Time: 03/15/19  5:10 AM   Result Value Ref Range    Triglycerides 132 0 - 150 mg/dL   POC GLUCOSE    Collection Time: 03/15/19  5:30 AM   Result Value Ref Range    Glucose - Accu-Ck 150 (A) 70 - 100 mg/dL   VANCOMYCIN TROUGH    Collection Time: 03/15/19  2:40 PM   Result Value Ref Range    Vancomycin Trough 15.5 10.0 - 20.0 ug/mL   POC GLUCOSE    Collection Time: 03/15/19  4:31 PM   Result Value Ref Range    Glucose - Accu-Ck 141 (A) 70 - 100 mg/dL   ISTAT ARTERIAL BLOOD GAS    Collection Time: 03/15/19 10:18 PM   Result Value Ref Range    Ph 7.459 7.400 - 7.500    Pco2  "47.3 (H) 26.0 - 37.0 mmHg    Po2 68 64 - 87 mmHg    Tco2 35 (H) 20 - 33 mmol/L    S02 94 93 - 99 %    Hco3 33.6 (H) 17.0 - 25.0 mmol/L    BE 8 (H) -4 - 3 mmol/L    Body Temp 35.5 C degrees    O2 Therapy 100 %    iPF Ratio 68     Ph Temp Alber 7.482 7.400 - 7.500    Pco2 Temp Co 44.3 (H) 26.0 - 37.0 mmHg    Po2 Temp Cor 61 (L) 64 - 87 mmHg    Specimen Arterial     Action Range Triggered NO     Inst. Qualified Patient YES    CBC with Differential    Collection Time: 03/15/19 11:00 PM   Result Value Ref Range    WBC 7.7 4.8 - 10.8 K/uL    RBC 6.21 (H) 4.70 - 6.10 M/uL    Hemoglobin 18.5 (H) 14.0 - 18.0 g/dL    Hematocrit 58.8 (H) 42.0 - 52.0 %    MCV 94.7 81.4 - 97.8 fL    MCH 29.8 27.0 - 33.0 pg    MCHC 31.5 (L) 33.7 - 35.3 g/dL    RDW 55.3 (H) 35.9 - 50.0 fL    Platelet Count 193 164 - 446 K/uL    MPV 10.5 9.0 - 12.9 fL    Neutrophils-Polys 75.00 (H) 44.00 - 72.00 %    Lymphocytes 13.20 (L) 22.00 - 41.00 %    Monocytes 8.50 0.00 - 13.40 %    Eosinophils 2.10 0.00 - 6.90 %    Basophils 0.90 0.00 - 1.80 %    Immature Granulocytes 0.30 0.00 - 0.90 %    Nucleated RBC 0.00 /100 WBC    Neutrophils (Absolute) 5.81 1.82 - 7.42 K/uL    Lymphs (Absolute) 1.02 1.00 - 4.80 K/uL    Monos (Absolute) 0.66 0.00 - 0.85 K/uL    Eos (Absolute) 0.16 0.00 - 0.51 K/uL    Baso (Absolute) 0.07 0.00 - 0.12 K/uL    Immature Granulocytes (abs) 0.02 0.00 - 0.11 K/uL    NRBC (Absolute) 0.00 K/uL       Imaging  DX-CHEST-PORTABLE (1 VIEW)    (Results Pending)   DX-CHEST-PORTABLE (1 VIEW)    (Results Pending)    *Personally reviewed chest x-ray upon arrival here showing enlarged cardiac and mediastinal silhouettes with edema, retrocardiac opacification, endotracheal tube/gastric tube/right IJ central venous catheter in position although tip of right IJ is several centimeters above the atrial caval junction   *CT head from 3/12 showing no acute intracranial process   *CT chest from 3/12  \"This is a motion limited examination.  No definite evidence of " "pulmonary embolus.  Cardiomegaly is noted.  Trace bilateral pleural effusions are noted.\"   *CT abdomen/pelvis from 3/12:  \"I do not see evidence of bowel obstruction or bowel inflammation.  Trace bilateral pleural effusions.  Extensive infiltration is noted throughout the anterior abdominal wall fat, compatible with either anasarca, or cellulitis.\"   *Echocardiogram from 3/13: CONCLUSIONS  Severely reduced left ventricular systolic function. Left ventricular   ejection fraction is visually estimated to be 25%.   Severely dilated right ventricle. Reduced right ventricular systolic   function.  Dilated inferior vena cava without inspiratory collapse.  Ordering physician was updated at the time of the read.    Assessment/Plan  Acute on chronic systolic congestive heart failure (HCC)- (present on admission)   Assessment & Plan    Decompensated, biventricular failure  Cardiology consultation  Forced diuresis  Inhaled Flolan for right ventricle afterload reduction  Blood pressure does not allow for beta-blocker or ACE inhibitor at this time  Consider inotropic support if hypotension persists     Acute hypoxemic respiratory failure (HCC)- (present on admission)   Assessment & Plan    Intubated outside facility on March 12, 2019  Continue full mechanical ventilatory support, trial APRV given obesity  RT/O2 protocols  Propofol and fentanyl drips for agitation/analgesia, consider transition to Precedex  Mobilization, daily sedation vacation and spontaneous breathing trials  Forced diuresis (start Lasix drip titrating to urine output greater than 150 mL's per hour)     Edema   Assessment & Plan    Diuresis and monitor     Class 3 severe obesity due to excess calories with serious comorbidity and body mass index (BMI) of 50.0 to 59.9 in adult (HCC)   Assessment & Plan    Encourage behavior modification and weight loss once extubated  Nutritionist consultation  Start enteral nutrition with low calorie formula     Pneumonia due " to infectious organism   Assessment & Plan    No healthcare associated risk factors prior to admission  Continue Rocephin and azithromycin for 2 additional days for a 5-day total course of antibiotics  Bronchoscopy with BAL, follow-up on cultures  Check pro-calcitonin     Demand ischemia (HCC)- (present on admission)   Assessment & Plan    With mildly elevated troponin upon arrival at outside hospital -improved  Continue aspirin and statin     Acute encephalopathy- (present on admission)   Assessment & Plan    Metabolic encephalopathy  Sedation vacation as tolerates  Maintain sleep/wake cycle, reorient as able     Hyperglycemia   Assessment & Plan    Check hemoglobin A1c  Insulin sliding scale     Hypernatremia   Assessment & Plan    Start free water enterally and monitor as ongoing needs for diuresis     Hypomagnesemia   Assessment & Plan    IV repletion and monitor closely     Polycythemia vera (HCC)   Assessment & Plan    Secondary to hypoxemia and obesity  Monitor, may need therapeutic phlebotomy     Full code    Discussed patient condition and risk of morbidity and/or mortality with Hospitalist, RN, RT, Pharmacy, Charge nurse / hot rounds, Patient and EMS, referring physician.    The patient remains critically ill.  Critical care time = 95 minutes in directly providing and coordinating critical care and extensive data review.  No time overlap and excludes procedures.

## 2019-03-16 NOTE — PROGRESS NOTES
· 2 RN skin check complete.  · Devices in place ET tube with holister, bilateral wrist restraints, pulse ox monitoring, cardiac monitor, BP cuff, left radial art line, right IJ triple, peripheral IV, cohn catheter, and bilateral SCD's .  · Generalized swelling/redness. No wounds.  · The following interventions in place sacral mepilex in place, pillows in use for support and repositioning, pillows in use to float heels, Q 2 hour turns.

## 2019-03-16 NOTE — ASSESSMENT & PLAN NOTE
With mildly elevated troponin upon arrival at outside hospital -down trended  Continue aspirin and statin

## 2019-03-16 NOTE — ASSESSMENT & PLAN NOTE
Encourage behavior modification and weight loss once extubated, family informed of the urgency of his weight loss  Nutritionist consultation  Start enteral nutrition with low calorie formula

## 2019-03-16 NOTE — PROGRESS NOTES
Patient 180 kg that was admitted 3 days ago at [facility]. He has been on a vent. He will have a discharge summary by the time patient gets here. Gonda is aware of his transfer.

## 2019-03-16 NOTE — DIETARY
"Nutrition Services:  Nutrition Support Assessment:  Day 1 of admit.  Sami Camacho is a 48 y.o. male with admitting DX of Respiratory Failure.     Current problem list:  1. Acute hypoxemic respiratory failure  2. Acute on chronic systolic congestive heart failure  3. Acute encephalopathy  4. Demand ischemia  5. Pneumonia due to infectious organism  6. Class 3 severe obesity due to excess calories with serious comorbidity and body mass index (BMI) of 50.0 to 59.9 in adult  7. Edema  8. Polycythemia vera  9. Hypomagnesemia  10. Hypernatremia  11. Hyperglycemia     Assessment:  Estimated Nutritional Needs based on:   Height: 180.3 cm (5' 11\") (Per 's license in chart)  Weight: (!) 183.3 kg (404 lb 1.7 oz)  Weight to Use in Calculations: 183.3 kg (404 lb 1.7 oz)  Ideal Body Weight: 78 kg (172 lb)  Percent Ideal Body Weight: 234.9  Body mass index is 56.36 kg/m².     Calculation/Equation: MSJ x 1.0 = 2718 kcals/day (65-70% = 6094-8589 kcals/day); RMR per [facility] (vent L/min: 5.0, Tmax/24 hrs: 38.3) = 2949 (65-70% = 6016-9817 kcals/day)  Total Calories / day: 1716 - 1950  (Calories / kg IBW: 22 - 25)  Total Grams Protein / day: 195+  (Grams Protein / kg IBW: 2.5+)     Evaluation:   1. Pt transferred from outside facility, admitted with altered mental status.  2. Pt with recent diagnosis of T2DM.  3. Pt is intubated and sedated.  4. Pertinent meds:  Zithromax, Rocephin, Lovenox, Pepcid, SSI, Mag sulfate, Lasix,  Pericolace, Levo 4 mcg/min  5. Propofol currently running @ 30 mcg (33 mL/hr) = 871 kcals/day; RD to monitor propofol and adjust TF as needed.  6. Hypo-caloric tube feedings indicated per [employer]M guidelines for ventilated pt with BMI >50.  7. Unable to provide hypo-caloric feedings d/t rate of propofol; will meet pt's estimated protein needs with specialized formula.     Malnutrition Risk: Unable to identify at this time.     Recommendations/Plan:  1. Start Peptamen Intense VHP @ 25 ml/hr and advance per " protocol to 75 ml/hr (goal rate with and without propofol) to provide 1800 kcal + kcal from propofol (15 kcal/kg ABW, 23 kcals/kg IBW), 166 grams protein (2.1 gm/kg IBW), and 1512 ml free water per day.   2. Also provide 5 packets of Beneprotein per day, to provide an additional 125 kcals and 30 grams protein. Total daily protein intake = 196 grams (2.5 gm/kg IBW, 1.1 gm/kg ABW).   3. Fluids per MD.  4. RD to monitor wt and lab trends.  5. PO diet when safe/appropriate per SLP/MD and pt can adequately consume.    RD following.

## 2019-03-16 NOTE — PROGRESS NOTES
"Critical Care Progress Note    Date of admission  3/15/2019    Chief Complaint  48 y.o. male admitted 3/15/2019 with VDRF, AECHF    Hospital Course    \"48 y.o. male who presented 3/15/2019 with past medical history significant for hypertension and morbid obesity who was admitted to [facility] on 3/12 for altered mental status times 24 hours.  He was slurring his words.  Daughter stated that patient had been depressed for a few months and had stopped taking care of himself.  He had recently been diagnosed with diabetes but lost his insurance.  He had about a 50 pound weight gain with lower extremity edema. In the emergency department he was altered and hypoxic with oxygen saturation of 60%.  He underwent CT imaging of his chest/abdomen/pelvis and was admitted to the hospital.  He developed hypercarbic respiratory failure and was intubated with some difficulty.  His hospital course was complicated with sedation issues trialing Precedex as well as propofol and fentanyl and pushes of Ativan.  A tox screen was negative and he was transiently on antibiotics including antiviral for possible encephalitis.  He has remained on vancomycin and Zosyn since then.  Blood cultures from admission date are negative. He was started on a dopamine drip transiently after an echocardiogram was performed revealed an ejection fraction of 25% with severely dilated right ventricle as well.  He was aggressively diuresed with a negative fluid loss of 11 L since admission but started to have increasing in serum bicarbonate levels as well as his pH.  He was seen by cardiology but given lack of pulmonary and critical care coverage decision was made to transfer patient to [facility] for higher level of care.  In route to the hospital patient's PEEP had to be increased from 10-12 given persistent desaturations.\"      Interval Problem Update  Reviewed last 24 hour events:   - transferred last night   -Biventricular failure, " diuresing   - Neuro: prop/fent, moves all   - HR: SR PVCs   - SBP: levo at 3   - GI: cortrak to be placed this AM, no BM   - UOP: maxed on lasix, 60 mL/hr   - Arnold: yes   - Tm: afeb   - Lines: CVC, art   - PPx: GI pepcid, DVT lovenox   - VD #5   - maxed on flolan, APRV, FiO2 weaning   - ABG: hypoxia   - CXR (personally reviewed and compared to prior): edema    Review of Systems  Review of Systems   Unable to perform ROS: Intubated        Vital Signs for last 24 hours   Temp:  [35.4 °C (95.7 °F)-38.3 °C (100.9 °F)] 38.1 °C (100.6 °F)  Pulse:  [] 90  Resp:  [11-39] 17  SpO2:  [84 %-100 %] 97 %    Hemodynamic parameters for last 24 hours       Respiratory Information for the last 24 hours  Marin Vent Mode: APRV  Rate (breaths/min): 14  Vt Target (mL): 500  PEEP/CPAP: 0  FiO2: 80  P Support: 0  P MEAN: 29  Control VTE (exp VT): 468    Physical Exam   Physical Exam   Constitutional: He appears well-developed and well-nourished. He is intubated.   HENT:   Head: Normocephalic and atraumatic.   Right Ear: External ear normal.   Left Ear: External ear normal.   Nose: Nose normal.   Mouth/Throat: Oropharynx is clear and moist.   ETT in position   Eyes: Pupils are equal, round, and reactive to light. Conjunctivae and EOM are normal.   Neck: Neck supple. No JVD present. No tracheal deviation present.   Right internal jugular central venous catheter   Cardiovascular: Regular rhythm and intact distal pulses.  Tachycardia present.    Pulmonary/Chest: No accessory muscle usage. He is intubated. He is in respiratory distress. He has rales.   Abdominal: Soft. Bowel sounds are normal. He exhibits no distension. There is no tenderness.   Morbidly obese   Musculoskeletal: He exhibits edema. He exhibits no tenderness or deformity.   Left radial art line   Neurological:   Sedated, when sedation is weaned the patient follows commands including grabbing hands and wiggling toes   Skin: Skin is warm and dry. No rash noted.    Multiple tattoos   Psychiatric:   sedated   Nursing note and vitals reviewed.      Medications  Current Facility-Administered Medications   Medication Dose Route Frequency Provider Last Rate Last Dose   • Respiratory Care per Protocol   Nebulization Continuous RT Jeremy M Gonda, M.D.       • famotidine (PEPCID) tablet 20 mg  20 mg Oral Q12HRS Jeremy M Gonda, M.D.        Or   • famotidine (PEPCID) injection 20 mg  20 mg Intravenous Q12HRS Jeremy M Gonda, M.D.   20 mg at 03/16/19 0600   • senna-docusate (PERICOLACE or SENOKOT S) 8.6-50 MG per tablet 2 Tab  2 Tab Oral BID Jeremy M Gonda, M.D.   Stopped at 03/15/19 2245    And   • polyethylene glycol/lytes (MIRALAX) PACKET 1 Packet  1 Packet Oral QDAY PRN Jeremy M Gonda, M.D.        And   • magnesium hydroxide (MILK OF MAGNESIA) suspension 30 mL  30 mL Oral QDAY PRN Jeremy M Gonda, M.D.        And   • bisacodyl (DULCOLAX) suppository 10 mg  10 mg Rectal QDAY PRN Jeremy M Gonda, M.D.       • MD Alert...ICU Electrolyte Replacement per Pharmacy   Other PHARMACY TO DOSE Jeremy M Gonda, M.D.       • Pharmacy Consult: Enteral tube insertion - review meds/change route/product selection  1 Each Other PHARMACY TO DOSE Jeremy M Gonda, M.D.       • enoxaparin (LOVENOX) inj 40 mg  40 mg Subcutaneous DAILY Jeremy M Gonda, M.D.   40 mg at 03/16/19 0601   • fentaNYL (SUBLIMAZE) injection 25 mcg  25 mcg Intravenous Q HOUR PRN Jeremy M Gonda, M.D.        Or   • fentaNYL (SUBLIMAZE) injection 50 mcg  50 mcg Intravenous Q HOUR PRN Jeremy M Gonda, M.D.        Or   • fentaNYL (SUBLIMAZE) injection 100 mcg  100 mcg Intravenous Q HOUR PRN Jeremy M Gonda, M.D.   100 mcg at 03/16/19 0440   • fentaNYL (SUBLIMAZE) 50 mcg/mL in 50mL   Intravenous Continuous Jeremy M Gonda, M.D. 2 mL/hr at 03/16/19 0410 100 mcg/hr at 03/16/19 0410   • ipratropium-albuterol (DUONEB) nebulizer solution  3 mL Nebulization Q2HRS PRN (RT) Jeremy M Gonda, M.D.       • propofol (DIPRIVAN) injection  0-80 mcg/kg/min  Intravenous Continuous Jeremy M Gonda, M.D. 33 mL/hr at 03/16/19 0550 30 mcg/kg/min at 03/16/19 0550   • furosemide (LASIX) 100 mg in  mL infusion  1-10 mg/hr Intravenous Continuous Jeremy M Gonda, M.D. 10 mL/hr at 03/16/19 0715 10 mg/hr at 03/16/19 0715   • epoprostenol (FLOLAN) 1.5 mg in glycine diluent for flolan 50 mL for Inhalation  0.01-0.05 mcg/kg/min Inhalation Continuous Jeremy M Gonda, M.D. 12 mL/hr at 03/16/19 0353 0.033 mcg/kg/min at 03/16/19 0353   • cefTRIAXone (ROCEPHIN) 2 g in  mL IVPB  2 g Intravenous Q24HRS Jeremy M Gonda, M.D.   Stopped at 03/16/19 0629   • azithromycin (ZITHROMAX) 500 mg in D5W 250 mL IVPB  500 mg Intravenous Q24HRS Jeremy M Gonda, M.D.   Stopped at 03/16/19 0659   • norepinephrine (LEVOPHED) 8 mg in  mL Infusion  0-30 mcg/min Intravenous Continuous Jeremy M Gonda, M.D. 7.5 mL/hr at 03/16/19 0551 4 mcg/min at 03/16/19 0551   • insulin regular (HUMULIN R) injection 2-9 Units  2-9 Units Subcutaneous Q6HRS Jeremy M Gonda, M.D.   Stopped at 03/16/19 0015    And   • glucose 4 g chewable tablet 16 g  16 g Oral Q15 MIN PRN Jeremy M Gonda, M.D.        And   • dextrose 50% (D50W) injection 25 mL  25 mL Intravenous Q15 MIN PRN Jeremy M Gonda, M.D.           Fluids    Intake/Output Summary (Last 24 hours) at 03/16/19 0756  Last data filed at 03/16/19 0600   Gross per 24 hour   Intake           272.71 ml   Output              500 ml   Net          -227.29 ml       Laboratory  Recent Labs      03/13/19   1933  03/14/19   0500  03/15/19   0510  03/15/19   2218  03/16/19   0541   DJDKE90Z  7.42  7.43  7.54*   --    --    [id number]  44  47*  41   --    --    AXKGK024O  67  69  52*   --    --    EREV4ZDL  92.0  92.0  87.0*   --    --    ARTHCO3  28.0  30.7*  34.3*   --    --    ARTBE  2.9  5.1*  10.4*   --    --    ISTATAPH   --    --    --   7.459  7.367*   ISTATAPCO2   --    --    --   47.3*  64.0*   ISTATAPO2   --    --    --   68  102*   ISTATATCO2   --    --    --   35*   39*   [id number]   --    --    --   94  97   ISTATARTHCO3   --    --    --   33.6*  36.7*   ISTATARTBE   --    --    --   8*  8*   ISTATTEMP   --    --    --   35.5 C  38.5 C   ISTATFIO2   --    --    --   100  80   ISTATSPEC   --    --    --   Arterial  Arterial   ISTATAPHTC   --    --    --   7.482  7.345*   [id number]   --    --    --   61*  112*     Recent Labs      03/13/19   1200   TROPONINI  0.19*     Recent Labs      03/14/19   0500  03/14/19   2045  03/15/19   0510  03/15/19   2300   SODIUM  145   --   146*  146*   POTASSIUM  4.2   --   3.5  3.2*   CHLORIDE  106   --   105  102   CO2  30   --   34*  35*   BUN  30*   --   22*  22   CREATININE  1.6*   --   1.2  1.17   MAGNESIUM   --   1.5*  1.7*  1.8   PHOSPHORUS   --   3.7   --   4.6*   CALCIUM  8.6   --   8.7  8.3*     Recent Labs      03/14/19   0500  03/15/19   0510  03/15/19   2300   ALTSGPT  27  20  12   ASTSGOT  30  40*  21   ALKPHOSPHAT  68  64  57   TBILIRUBIN  1.7*  1.5*  1.2   PREALBUMIN   --    --   7.0*   GLUCOSE  163*  150*  119*     Recent Labs      03/14/19   0500  03/15/19   0510  03/15/19   2300  03/16/19   0459   WBC  10.5  10.3  7.7  11.1*   NEUTSPOLYS   --    --   75.00*  83.80*   LYMPHOCYTES   --    --   13.20*  6.80*   MONOCYTES   --    --   8.50  7.00   EOSINOPHILS   --    --   2.10  1.30   BASOPHILS   --    --   0.90  0.60   ASTSGOT  30  40*  21   --    ALTSGPT  27  20  12   --    ALKPHOSPHAT  68  64  57   --    TBILIRUBIN  1.7*  1.5*  1.2   --      Recent Labs      03/15/19   0510  03/15/19   2300  03/16/19   0459   RBC  5.97  6.21*  6.07   HEMOGLOBIN  17.7  18.5*  18.5*   HEMATOCRIT  54.7*  58.8*  58.1*   PLATELETCT  214  193  219   PROTHROMBTM   --   16.6*   --    APTT   --   30.8   --    INR   --   1.33*   --        Imaging  X-Ray:  I have personally reviewed the images and compared with prior images.  EKG:  I have personally reviewed the images and compared with prior images.  Echo:   Reviewed    Assessment/Plan  Acute on  chronic systolic congestive heart failure (HCC)- (present on admission)   Assessment & Plan    Decompensated, biventricular failure  Cardiology consultation  Forced diuresis  Inhaled Flolan for right ventricle afterload reduction  Blood pressure does not allow for beta-blocker or ACE inhibitor at this time  Consider inotropic support if hypotension persists     Acute hypoxemic respiratory failure (HCC)- (present on admission)   Assessment & Plan    Intubated outside facility on March 12, 2019  Continue full mechanical ventilatory support, trial APRV given obesity  RT/O2 protocols  Propofol and fentanyl drips for agitation/analgesia, consider transition to Precedex  Mobilization, daily sedation vacation and spontaneous breathing trials  Forced diuresis (start Lasix drip titrating to urine output greater than 150 mL's per hour)     Edema   Assessment & Plan    Diuresis and monitor     Class 3 severe obesity due to excess calories with serious comorbidity and body mass index (BMI) of 50.0 to 59.9 in adult ([facility])   Assessment & Plan    Encourage behavior modification and weight loss once extubated  Nutritionist consultation  Start enteral nutrition with low calorie formula     Pneumonia due to infectious organism   Assessment & Plan    No healthcare associated risk factors prior to admission  Continue Rocephin and azithromycin for 2 additional days for a 5-day total course of antibiotics  Bronchoscopy with BAL, follow-up on cultures  Check pro-calcitonin     Demand ischemia (HCC)- (present on admission)   Assessment & Plan    With mildly elevated troponin upon arrival at outside hospital -improved  Continue aspirin and statin     Acute encephalopathy- (present on admission)   Assessment & Plan    Metabolic encephalopathy  Sedation vacation as tolerates  Maintain sleep/wake cycle, reorient as able     Hyperglycemia   Assessment & Plan    Check hemoglobin A1c  Insulin sliding scale     Hypernatremia   Assessment & Plan     Start free water enterally and monitor as ongoing needs for diuresis     Hypomagnesemia   Assessment & Plan    IV repletion and monitor closely     Polycythemia vera (HCC)   Assessment & Plan    Secondary to hypoxemia and obesity  Monitor, may need therapeutic phlebotomy          VTE:  Lovenox  Ulcer: H2 Antagonist  Lines: Central Line  Ongoing indication addressed, Arterial Line  Ongoing indication addressed and Arnold Catheter  Ongoing indication addressed    I have performed a physical exam and reviewed and updated ROS and Plan today (3/16/2019). In review of yesterday's note (3/15/2019), there are no changes except as documented above.     Continuing to address ventilator status with multiple titrations based on ventilator waveforms, aggressive diuresis ongoing for acute decompensated heart. This patient is critically ill, at high risk for decompensation leading to worsening vital organ dysfunction and death without critical care interventions.    Discussed patient condition and risk of morbidity and/or mortality with Hospitalist, Family, RN, RT, Pharmacy, Dietary, , Charge nurse / hot rounds and Patient     The patient remains critically ill.  Critical care time = 36 minutes in directly providing and coordinating critical care and extensive data review.  No time overlap and excludes procedures.

## 2019-03-16 NOTE — PROGRESS NOTES
Direct admit from [facility], referred by Dr. Galvan. For Respiratoyr Failure. Admitting MD is Dr. Mike ROONEY, Dr. Gonda is aware of the patient. Upon patient's arrival release signed and held orders, page admit hospitalist on call for orders.

## 2019-03-17 NOTE — PROGRESS NOTES
Patient's daughter Marysol called for updates and plan of care. Plan of care discussed with patient' daughter. Daughter consented over the phone with 2 RN's (Corby RN and Bridgette NICHOLS RN) to have Dr. Parr complete a central line placement, Arterial line placement and Bronchoscopy. Consents placed in patient's chart.

## 2019-03-17 NOTE — CARE PLAN
Problem: Ventilation Defect:  Goal: Ability to achieve and maintain unassisted ventilation or tolerate decreased levels of ventilator support    Intervention: Support and monitor invasive and noninvasive mechanical ventilation  Adult Ventilation Update    Total Vent Days: 6    Patient Lines/Drains/Airways Status    Active Airway     Name: Placement date:       Airway ETT Oral 8.0 03/13/19              Plateau Pressure (Q Shift): 18   Static Compliance (ml / cm H2O): 66    Sputum/Suction   Cough: Congested   Sputum Amount: Small  Sputum Color: Tan;Clear   Sputum Consistency: Thick     Mobility  Level of Mobility: Level I (03/17/19 0800)  Activity Performed: Unable to mobilize (03/17/19 0800)    Events/Summary/Plan: APVCMV (03/17/19 1015)

## 2019-03-17 NOTE — ASSESSMENT & PLAN NOTE
This began prior to admission with confusion and slurring his speech per his daughters,  CT head was negative on admit  He was not a candidate for a lumbar puncture due to mechanical ventilation and habitus.  Consider hypercarbia

## 2019-03-17 NOTE — CONSULTS
Cardiology Initial Consultation    Date of Service  3/17/2019    Referring Physician  Jose Parr Jr., D.O.    Reason for Consultation  Concern for heart failure    History of Presenting Illness  Sami Camacho is a 48 y.o. male with a past medical history of noncompliance and purposeful non-interaction with medical care compounded by morbid severe chronic obesity and untreated sleep apnea      Seen by my partner at the outside hospital after being intubated.  Meeting with the daughter who expressed her grief at her father not following with doctors.  The daughter expressed her father's wishes had been to stay away from intensive medical care, he was noncompliant with diagnosis and treatment of sleep apnea which was advanced.  She said he had quit drinking alcohol months or may be a year prior    He was admitted to the outside hospital on the 12th and transferred here on the 15th after seeing my partner.  He was admitted there after her daughter found him at home confused slurring she admitted that he was depressed in the last months.  He was not working was not able to take care of himself.  He was diagnosed with diabetes but had no health insurance and did not get labs or follow-up or take his medicines.  He was intubated in the emergency room there.  Severe hypercarbic respiratory failure, needed dobutamine dopamine for low blood pressure    Echocardiogram with biventricular failure that was advanced, ejection fraction on the left less than 25%    Review of Systems  Review of Systems   Unable to perform ROS: Intubated       Past Medical History  Unknown as he was noncompliant with Dr. follow-up    Surgical History  None    Family History  family history includes Cancer in his father and mother.    Social History   reports that he has never smoked. He has never used smokeless tobacco. He reports that he drinks alcohol. He reports that he does not use drugs.    Medications  Prior to Admission Medications    Prescriptions Last Dose Informant Patient Reported? Taking?   acetaminophen (TYLENOL) 500 MG Tab   Yes No   Sig: Take 1,000 mg by mouth every 6 hours as needed.      Facility-Administered Medications: None       Allergies  No Known Allergies    Vital signs in last 24 hours  Temp:  [37.1 °C (98.8 °F)-38 °C (100.4 °F)] 38 °C (100.4 °F)  Pulse:  [] 91  Resp:  [11-24] 24  SpO2:  [89 %-98 %] 92 %    Physical Exam  Physical Exam   Constitutional: He is oriented to person, place, and time.   Morbidly obese, intubated and sedated   Eyes: Pupils are equal, round, and reactive to light. EOM are normal. No scleral icterus.   Neck: No JVD present. No thyromegaly present.   Cardiovascular:   Sinus tach   Abdominal: Soft. Bowel sounds are normal.   Musculoskeletal: He exhibits no edema or tenderness.   Lymphadenopathy:     He has no cervical adenopathy.   Neurological: He is alert and oriented to person, place, and time.   Skin: Skin is warm and dry. No rash noted.       Lab Review  Lab Results   Component Value Date/Time    WBC 9.4 03/17/2019 03:49 AM    RBC 5.54 03/17/2019 03:49 AM    HEMOGLOBIN 16.5 03/17/2019 03:49 AM    HEMATOCRIT 54.2 (H) 03/17/2019 03:49 AM    MCV 97.8 03/17/2019 03:49 AM    MCH 29.8 03/17/2019 03:49 AM    MCHC 30.4 (L) 03/17/2019 03:49 AM    MPV 10.8 03/17/2019 03:49 AM      Lab Results   Component Value Date/Time    SODIUM 149 (H) 03/17/2019 03:49 AM    POTASSIUM 3.6 03/17/2019 11:15 AM    CHLORIDE 103 03/17/2019 03:49 AM    CO2 39 (H) 03/17/2019 03:49 AM    GLUCOSE 163 (H) 03/17/2019 03:49 AM    BUN 24 (H) 03/17/2019 03:49 AM    CREATININE 1.29 03/17/2019 03:49 AM      Lab Results   Component Value Date/Time    ASTSGOT 21 03/15/2019 11:00 PM    ALTSGPT 12 03/15/2019 11:00 PM     Lab Results   Component Value Date/Time    TRIGLYCERIDE 121 03/17/2019 12:47 AM    TROPONINI 0.19 () 03/13/2019 12:00 PM             Cardiac Imaging and Procedures Review  EKG: I have no EKGs although my partner read  his EKG on the 12th as sinus tachycardia    Echocardiogram: Severe biventricular dysfunction, likely chronic no significant valve disease pulmonary pressure not assessed    Imaging  Chest X-Ray: Stable bilateral infiltrates      Assessment/Plan    Biventricular heart failure, chronic severe  Due to massive obesity and noncompliance of sleep apnea  Unfortunately this is likely not an acute issue  Prognosis quite grim -I have no evidence that there is an acute coronary syndrome playing into this, see below  Vasopressor support and diuresis as you are doing, my impression is that this is likely end-stage cor pulmonale due to right heart failure    Indeterminate troponin level  Agree with my partner summation that this is likely mild ischemia due to demand  The treatment in this case as of the primary insult which appears to be respiratory failure  He is a diabetic and has risk factors for premature coronary disease, we will follow these closely with the primary team  I have ordered an EKG    End-of-life/care plan management  Discussed this with the primary team including Dr. Calero  The patient's wishes as illustrated by his current and prior care team seem to be discrepant from that of his daughters and family.  Understanding that this is quite complex and difficult of a conversation  Continue family meeting plans.  Certainly no rush for an invasive workup.  I do not think this will yield much in the setting anyway, I have no evidence that coronary disease in the acute fashion has gotten him to this place    Thank you for allowing me to participate in the care of this patient.  Please contact me with any questions.    Pippa Duncan M.D.   Cardiologist, [facility]  [phone]

## 2019-03-17 NOTE — ASSESSMENT & PLAN NOTE
With poor ejection fraction  He had undergone aggressive diuresis at [facility] prior to transfer  He is not a candidate for an ACE inhibitor nor beta blocker due to hypotension  IV lasix drip  Echocardiogram:  Left Ventricle  Poor endocardial definition.  Cannot evaluate LV size and thickness.   Severely reduced left ventricular systolic function. Left ventricular   ejection fraction is visually estimated to be 25%. Global hypokinesis.   Wall motion is difficult to assess with the limitations of image   quality. Indeterminate diastolic function.    Right Ventricle  Severely dilated right ventricle. Reduced right ventricular systolic   function.    Right Atrium  Markedly enlarged right atrium. Dilated inferior vena cava without   inspiratory collapse.    Left Atrium  The left atrium is normal in size.  Left atrial volume index is 22    mL/sq m.    Mitral Valve  Structurally normal mitral valve without significant stenosis or   reguritaiton.    Aortic Valve  Aortic valve is poorly seen, unable to obtain AV cusp doppler.    Tricuspid Valve  The tricuspid valve is poorly seen.Trace tricuspid regurgitation.unable   to estimate pulmonary artery pressure due to an inadequate tricuspid   regurgitant jet.    Pulmonic Valve  The pulmonic valve is not well visualized. Mild pulmonic insufficiency.    Pericardium  Pericardium not well visualized.  No pericardial effusion noted in   views obtained.    Aorta  The aortic root is poorly seen.

## 2019-03-17 NOTE — CARE PLAN
Problem: Ventilation Defect:  Goal: Ability to achieve and maintain unassisted ventilation or tolerate decreased levels of ventilator support  Adult Ventilation Update    Total Vent Days: 6    Patient Lines/Drains/Airways Status    Active Airway     Name: Placement date: Placement time: Site: Days:    Airway ETT Oral 8.0 03/13/19   0010   Oral   6                    Plateau Pressure (Q Shift): 18 (03/16/19 1829)  Static Compliance (ml / cm H2O): 50 (03/17/19 0103)    Patient failed trials because of Barriers to Wean: Other (Comments);FiO2 >60% or PEEP >10 CM H2O (APRV) (03/16/19 1039)  Barriers to SBT  On ARRV, Flolan        Sputum/Suction      Sputum Amount: Small (03/16/19 2226)  Sputum Color: Yellow;White (03/16/19 2226)  Sputum Consistency: Thick (03/16/19 2226)    Mobility  Level of Mobility: Level I (03/16/19 1400)  Activity Performed: Unable to mobilize (03/16/19 1400)  Reason Not Mobilized: Unstable condition (03/16/19 0800)  Mobilization Comments: desats with agitation and movement high APRV setting (03/16/19 0800)    Events/Summary/Plan: Continue to support respiratory status and Flolan

## 2019-03-17 NOTE — ASSESSMENT & PLAN NOTE
Requiring endotracheal intubation on 3/12  Critical Care has consulted.  He is on a continuous Flolan infusion.  His FiO2 remains high at 80%  Likely secondary to a combination of heart failure and pneumonia in the setting of restrictive lung process secondary to habitus

## 2019-03-17 NOTE — CARE PLAN
Problem: Respiratory:  Goal: Respiratory status will improve  Outcome: PROGRESSING SLOWER THAN EXPECTED  Patient desatted down to 70s. Remains vented on APRV.  Intervention: Assess and monitor pulmonary status  Completed and documented  Intervention: Administer and titrate oxygen therapy  Completed and documented      Problem: Hemodynamic Status  Goal: Vital Signs and Fluid Balance Management  Outcome: PROGRESSING AS EXPECTED    Intervention: Cardiac Monitoring, Pulse Oximetry  Completed and documented.  Intervention: Hemodynamic Monitoring  Completed and documented.  Intervention: Monitor I & O, Manage IV fluids and IV infusions  Completed and documented.  Intervention: Assess peripheral pulses and capillary refill  Completed and documented.  Intervention: Assess for edema (eg. peripheral, sacral, periorbital, abdominal edema)  Completed and documented.

## 2019-03-17 NOTE — ASSESSMENT & PLAN NOTE
As HbA1c is 9.2, this is consistent with diabetes mellitus that had not been previously diagnosed.  Sliding scale insulin.

## 2019-03-17 NOTE — PROGRESS NOTES
Received bedside report from LAVELL Scherer. Assumed care of patient. Continuous infusions and restraints verified during bedside report. Patient turned and repositioned.

## 2019-03-17 NOTE — PROGRESS NOTES
After patient's bath patient became agitated. Evidenced by heart rate increase up into the 1teens, BP increase into 170s and oxygen desaturation down into the 70s. Propofol restarted (see MAR). Patient vital signs now stable Bp 120s systolic, HR 90s, O2 saturation 94%

## 2019-03-17 NOTE — PROGRESS NOTES
Dr. Parr at bedside to complete therapeutic bronchoscopy. Time out called for Bronchoscopy @ 3811, all agree. Medication given, see MAR. VSS throughout procedure.

## 2019-03-17 NOTE — CARE PLAN
Problem: Fluid Volume:  Goal: Will maintain balanced intake and output  Strict IO maintained throughout Day. Lasix gtt titrated to maintain urine output of at least 150/hr. The majority of the day this goal was not met. Karolyn Cartwright made aware. No orders received. From [id number] the UO goal was exceeded and lasix had to be titrated down.     Problem: Hemodynamic Status  Goal: Vital Signs and Fluid Balance Management  Titrated off levophed as tolerated. As patient wakes up blood pressure increases and need for levophed goes down.

## 2019-03-17 NOTE — CARE PLAN
Problem: Venous Thromboembolism (VTW)/Deep Vein Thrombosis (DVT) Prevention:  Goal: Patient will participate in Venous Thrombosis (VTE)/Deep Vein Thrombosis (DVT)Prevention Measures    Intervention: Ensure patient wears graduated elastic stockings (NIYA hose) and/or SCDs, if ordered, when in bed or chair (Remove at least once per shift for skin check)  SCD's in place.       Problem: Hemodynamic Status  Goal: Vital Signs and Fluid Balance Management    Intervention: Cardiac Monitoring, Pulse Oximetry  Patient on continuous monitor.   Intervention: Monitor I & O, Manage IV fluids and IV infusions  Patient on Lasix gtt. Lasix titrated to maintain urine output 150 mL/hr.

## 2019-03-17 NOTE — PROGRESS NOTES
"Hospitalist to sign off while on ventilator.  Care discussed in AM ICU MDR  Alerted Dr Parr and he was good with assuming \"Attending\" care.  Please alert when ready to extubate or if further medical assistance requested.          Thank you,  Maximiliano Calero  "

## 2019-03-17 NOTE — PROGRESS NOTES
Dr. Parr at bedside to place Central Line. Time out called for central line @ 1425. VSS throughout procedure.

## 2019-03-17 NOTE — PROGRESS NOTES
"Critical Care Progress Note    Date of admission  3/15/2019    Chief Complaint  48 y.o. male admitted 3/15/2019 with VDRF, AECHF    Hospital Course    \"48 y.o. male who presented 3/15/2019 with past medical history significant for hypertension and morbid obesity who was admitted to [facility] [place], [place] on 3/12 for altered mental status times 24 hours.  He was slurring his words.  Daughter stated that patient had been depressed for a few months and had stopped taking care of himself.  He had recently been diagnosed with diabetes but lost his insurance.  He had about a 50 pound weight gain with lower extremity edema. In the emergency department he was altered and hypoxic with oxygen saturation of 60%.  He underwent CT imaging of his chest/abdomen/pelvis and was admitted to the hospital.  He developed hypercarbic respiratory failure and was intubated with some difficulty.  His hospital course was complicated with sedation issues trialing Precedex as well as propofol and fentanyl and pushes of Ativan.  A tox screen was negative and he was transiently on antibiotics including antiviral for possible encephalitis.  He has remained on vancomycin and Zosyn since then.  Blood cultures from admission date are negative. He was started on a dopamine drip transiently after an echocardiogram was performed revealed an ejection fraction of 25% with severely dilated right ventricle as well.  He was aggressively diuresed with a negative fluid loss of 11 L since admission but started to have increasing in serum bicarbonate levels as well as his pH.  He was seen by cardiology but given lack of pulmonary and critical care coverage decision was made to transfer patient to [facility] for higher level of care.  In route to the hospital patient's PEEP had to be increased from 10-12 given persistent desaturations.\"      Interval Problem Update  Reviewed last 24 hour events:   - Hypoxic overnight, requiring more sedation and bronch   - " copious sputum from in-line suction today   - Neuro: moves all, sedated   - HR: 80s-90s   - SBP: 80s-110s   - GI: BM PTA   - UOP: 3.6L/24 hrs   - Arnold: yes   - Tm: 38.4   - Lines: CVC - to be exchanged today given soiled line from outside hospital   - PPx: GI pepcid, DVT lovenox   - ABG: improved oxygenation   - CXR (personally reviewed and compared to prior): improved edema, continued cardiomegaly    Consulted cardiology, reports from prior hospital stay the patient may have not wanted intensive care treatments.  Palliative care has been consulted for a family meeting to discuss goals of care in this setting.  No plans for aggressive cardiac interventions.    Yesterda   - transferred last night   - Biventricular failure, diuresing   - Neuro: prop/fent, moves all   - HR: SR PVCs   - SBP: levo at 3   - GI: cortrak to be placed this AM, no BM   - UOP: maxed on lasix, 60 mL/hr   - Arnold: yes   - Tm: afeb   - Lines: CVC, art   - PPx: GI pepcid, DVT lovenox   - VD #5   - maxed on flolan, APRV, FiO2 weaning   - ABG: hypoxia   - CXR (personally reviewed and compared to prior): edema    Review of Systems  Review of Systems   Unable to perform ROS: Intubated        Vital Signs for last 24 hours   Temp:  [37.4 °C (99.3 °F)-38.4 °C (101.1 °F)] 37.5 °C (99.5 °F)  Pulse:  [] 89  Resp:  [11-33] 11  SpO2:  [89 %-98 %] 92 %    Hemodynamic parameters for last 24 hours       Respiratory Information for the last 24 hours  [place] Vent Mode: APRV  PEEP/CPAP: 0  FiO2: 50  P Support: 0  P MEAN: 29  Control VTE (exp VT): 464    Physical Exam   Physical Exam   Constitutional: He appears well-developed and well-nourished. He is intubated.   HENT:   Head: Normocephalic and atraumatic.   Right Ear: External ear normal.   Left Ear: External ear normal.   Nose: Nose normal.   Mouth/Throat: Oropharynx is clear and moist.   ETT in position   Eyes: Pupils are equal, round, and reactive to light. Conjunctivae and EOM are normal.   Neck:  Neck supple. No JVD present. No tracheal deviation present.   Right internal jugular central venous catheter   Cardiovascular: Regular rhythm and intact distal pulses.  Tachycardia present.    Pulmonary/Chest: No accessory muscle usage. He is intubated. He is in respiratory distress. He has rales.   Abdominal: Soft. Bowel sounds are normal. He exhibits no distension. There is no tenderness.   Morbidly obese   Musculoskeletal: He exhibits edema. He exhibits no tenderness or deformity.   Left radial art line   Neurological:   Sedated, patient continues to intermittently follow commands when sedation is weaned   Skin: Skin is warm and dry. No rash noted.   Multiple tattoos   Psychiatric:   sedated   Nursing note and vitals reviewed.      Medications  Current Facility-Administered Medications   Medication Dose Route Frequency Provider Last Rate Last Dose   • potassium chloride in water (KCL) ivpb **Administer in ICU only** 20 mEq  20 mEq Intravenous Once Jose Parr Jr., D.O.       • K+ Scale: Goal of 4.5  1 Each Intravenous Q6HRS SIMONE Christensen Jr..O.   1 Each at 03/17/19 0600   • fentaNYL (SUBLIMAZE) injection 25 mcg  25 mcg Intravenous Q15 MIN PRN Jose Parr Jr. D.O.   25 mcg at 03/17/19 0230   • fentaNYL (SUBLIMAZE) 50 mcg/mL in 50mL   Intravenous Continuous SIMONE Christensen Jr..O. 2 mL/hr at 03/17/19 0005 100 mcg/hr at 03/17/19 0005   • famotidine (PEPCID) tablet 20 mg  20 mg Enteral Tube Q12HRS Jose Parr Jr. D.O.   20 mg at 03/17/19 0513    Or   • famotidine (PEPCID) injection 20 mg  20 mg Intravenous Q12HRS Jose Parr Jr., D.O.       • senna-docusate (PERICOLACE or SENOKOT S) 8.6-50 MG per tablet 2 Tab  2 Tab Enteral Tube BID SIMONE Christensen Jr..O.   2 Tab at 03/17/19 0512    And   • polyethylene glycol/lytes (MIRALAX) PACKET 1 Packet  1 Packet Enteral Tube QDAY PRN Jose Parr Jr. D.O.        And   • magnesium hydroxide (MILK OF MAGNESIA) suspension 30 mL  30 mL Enteral Tube  QDAY PRN Jose Parr Jr., D.O.        And   • bisacodyl (DULCOLAX) suppository 10 mg  10 mg Rectal QDAY PRN Jose Parr Jr., D.O.       • Respiratory Care per Protocol   Nebulization Continuous RT Jeremy M Gonda, M.D.       • MD Alert...ICU Electrolyte Replacement per Pharmacy   Other PHARMACY TO DOSE Jeremy M Gonda, M.D.       • Pharmacy Consult: Enteral tube insertion - review meds/change route/product selection  1 Each Other PHARMACY TO DOSE Jeremy M Gonda, M.D.       • enoxaparin (LOVENOX) inj 40 mg  40 mg Subcutaneous DAILY Jeremy M Gonda, M.D.   40 mg at 03/17/19 0513   • ipratropium-albuterol (DUONEB) nebulizer solution  3 mL Nebulization Q2HRS PRN (RT) Jeremy M Gonda, M.D.       • propofol (DIPRIVAN) injection  0-80 mcg/kg/min Intravenous Continuous Jeremy M Gonda, M.D. 11 mL/hr at 03/17/19 0742 10 mcg/kg/min at 03/17/19 0742   • furosemide (LASIX) 100 mg in  mL infusion  1-10 mg/hr Intravenous Continuous Jeremy M Gonda, M.D. 4 mL/hr at 03/17/19 0533 4 mg/hr at 03/17/19 0533   • epoprostenol (FLOLAN) 1.5 mg in glycine diluent for flolan 50 mL for Inhalation  0.01-0.05 mcg/kg/min Inhalation Continuous Jeremy M Gonda, M.D. 12 mL/hr at 03/17/19 0749 0.033 mcg/kg/min at 03/17/19 0749   • cefTRIAXone (ROCEPHIN) 2 g in  mL IVPB  2 g Intravenous Q24HRS Jeremy M Gonda, M.D.   Stopped at 03/17/19 0543   • azithromycin (ZITHROMAX) 500 mg in D5W 250 mL IVPB  500 mg Intravenous Q24HRS Jeremy M Gonda, M.D.   Stopped at 03/17/19 0613   • norepinephrine (LEVOPHED) 8 mg in  mL Infusion  0-30 mcg/min Intravenous Continuous Jeremy M Gonda, M.D.   Stopped at 03/17/19 0005   • insulin regular (HUMULIN R) injection 2-9 Units  2-9 Units Subcutaneous Q6HRS Jeremy M Gonda, M.D.   2 Units at 03/17/19 0636    And   • dextrose 50% (D50W) injection 25 mL  25 mL Intravenous Q15 MIN PRN Jeremy M Gonda, M.D.           Fluids    Intake/Output Summary (Last 24 hours) at 03/17/19 0821  Last data filed at 03/17/19  0800   Gross per 24 hour   Intake          1474.63 ml   Output             3780 ml   Net         -2305.37 ml       Laboratory  Recent Labs      03/15/19   0510  03/15/19   2218  03/16/19   0541  03/17/19   0506   ROGAN74R  7.54*   --    --    --    [id number]  41   --    --    --    [id number]  52*   --    --    --    OKBO9QYT  87.0*   --    --    --    ARTHCO3  34.3*   --    --    --    ARTBE  10.4*   --    --    --    ISTATAPH   --   7.459  7.367*  7.386*   ISTATAPCO2   --   47.3*  64.0*  59.4*   ISTATAPO2   --   68  102*  88*   ISTATATCO2   --   35*  39*  37*   WWLFYKQ3IST   --   94  97  96   ISTATARTHCO3   --   33.6*  36.7*  35.6*   ISTATARTBE   --   8*  8*  8*   ISTATTEMP   --   35.5 C  38.5 C  37.5 C   ISTATFIO2   --   100  80  50   ISTATSPEC   --   Arterial  Arterial  Arterial   ISTATAPHTC   --   7.482  7.345*  7.378*   [id number]   --   61*  112*  91*         Recent Labs      03/15/19   2300  03/16/19   0651   03/17/19   0047  03/17/19   0349  03/17/19   0636   SODIUM  146*  148*   --    --   149*   --    POTASSIUM  3.2*  3.4*   < >  3.4*  3.8  3.6   CHLORIDE  102  101   --    --   103   --    CO2  35*  36*   --    --   39*   --    BUN  22  23*   --    --   24*   --    CREATININE  1.17  1.22   --    --   1.29   --    MAGNESIUM  1.8  2.2   --    --   2.0   --    PHOSPHORUS  4.6*  5.2*   --    --   3.1   --    CALCIUM  8.3*  8.5   --    --   8.4*   --     < > = values in this interval not displayed.     Recent Labs      03/15/19   0510  03/15/19   2300  03/16/19   0651  03/17/19 0349   ALTSGPT  20  12   --    --    ASTSGOT  40*  21   --    --    ALKPHOSPHAT  64  57   --    --    TBILIRUBIN  1.5*  1.2   --    --    PREALBUMIN   --   7.0*   --    --    GLUCOSE  150*  119*  135*  163*     Recent Labs      03/15/19   0510  03/15/19   2300 03/16/19   0459  03/17/19 0349   WBC  10.3  7.7  11.1*  9.4   NEUTSPOLYS   --   75.00*  83.80*  80.60*   LYMPHOCYTES   --   13.20*  6.80*  6.20*   MONOCYTES   --   8.50   7.00  10.90   EOSINOPHILS   --   2.10  1.30  1.20   BASOPHILS   --   0.90  0.60  0.70   ASTSGOT  40*  21   --    --    ALTSGPT  20  12   --    --    ALKPHOSPHAT  64  57   --    --    TBILIRUBIN  1.5*  1.2   --    --      Recent Labs      03/15/19   2300  03/16/19   0459  03/17/19   0349   RBC  6.21*  6.07  5.54   HEMOGLOBIN  18.5*  18.5*  16.5   HEMATOCRIT  58.8*  58.1*  54.2*   PLATELETCT  193  219  171   PROTHROMBTM  16.6*   --    --    APTT  30.8   --    --    INR  1.33*   --    --        Imaging  X-Ray:  I have personally reviewed the images and compared with prior images.  EKG:  I have personally reviewed the images and compared with prior images.  Echo:   Reviewed    Assessment/Plan  Acute on chronic systolic congestive heart failure (HCC)- (present on admission)   Assessment & Plan    Decompensated, biventricular failure  Awaiting cardiology consultation  Continue forced diuresis with Lasix infusion  Inhaled Flolan for right ventricle afterload reduction  Blood pressure does not allow for beta-blocker or ACE inhibitor at this time  No longer requiring inotropic support with levophed  Cardiology consulted, no interventions planned at this time     Acute hypoxemic respiratory failure (HCC)- (present on admission)   Assessment & Plan    Intubated outside facility on March 12, 2019  Currently dyssynchronous with APRV  RT/O2 protocols  Daily and PRN ABGs  Titration of ventilator therapy based on ABGs and patient's status  Sedation as tolerated/indicated  Daily CXR  HOB >30 degrees and peridex for VAP prevention  Pepcid for GI prophylaxis  SAT/SBT when able (ABCDEF Bundle)  Early mobility  Transitioned from APRV to CMV with high PEEP, will continue Flolan.  Patient may tolerate APRV again if needed and sedated heavily     Edema- (present on admission)   Assessment & Plan    Forced diuresis and monitor     Class 3 severe obesity due to excess calories with serious comorbidity and body mass index (BMI) of 50.0 to  59.9 in adult (HCC)- (present on admission)   Assessment & Plan    Encourage behavior modification and weight loss once extubated, family informed of the urgency of his weight loss  Nutritionist consultation  Start enteral nutrition with low calorie formula     Pneumonia due to infectious organism- (present on admission)   Assessment & Plan    No healthcare associated risk factors prior to admission  Continue Rocephin and azithromycin for 2 additional days for a 5-day total course of antibiotics  Bronchoscopy with BAL, follow-up on cultures  pro-calcitonin elevated  UA at outside hospital unremarkable  Continues to be febrile     Demand ischemia (HCC)- (present on admission)   Assessment & Plan    With mildly elevated troponin upon arrival at outside hospital -down trended  Continue aspirin and statin     Acute encephalopathy- (present on admission)   Assessment & Plan    Metabolic encephalopathy  Sedation vacation as tolerates  CNS infection considered unlikely given improvement in encephalopathy and no meningismus on exam    Keep patient awake during the day and avoid daytime naps. Remove all unnecessary lines (central lines, peripheral IVs, feeding tubes, cohn catheters). Avoid polypharmacy, frequent re-orientation, maximize family time at bedside, use glasses and hearing aids if needed, treat pain, encourage ambulation, minimize benzos/anticholinergic agents.          Fever- (present on admission)   Assessment & Plan    Likely infectious, pneumonia vs other  Continue current antimicrobial course with low threshold to expand regimen  Follow-up cultures     Goals of care, counseling/discussion- (present on admission)   Assessment & Plan    Palliative care consulted for goals of care discussion with family given prior documentation of patient's wishes     Hypokalemia- (present on admission)   Assessment & Plan    Likely to worsen while on Lasix infusion  Continue K scale     Hyperglycemia- (present on admission)    Assessment & Plan    Poorly controlled, non-compliant at home  Goal blood glucose 120-180  sliding scale insulin, accuchecks  hypoglycemia protocol  9.2 A1c       Hypernatremia- (present on admission)   Assessment & Plan    Continue free water enterally and monitor while ongoing needs for diuresis     Hypomagnesemia- (present on admission)   Assessment & Plan    IV repletion and monitor closely  Electrolyte placement protocol     Polycythemia vera (HCC)- (present on admission)   Assessment & Plan    Secondary to hypoxemia and obesity  Monitor, may need therapeutic phlebotomy          VTE:  Lovenox  Ulcer: H2 Antagonist  Lines: Central Line  Ongoing indication addressed, Arterial Line  Ongoing indication addressed and Arnold Catheter  Ongoing indication addressed    I have performed a physical exam and reviewed and updated ROS and Plan today (3/17/2019). In review of yesterday's note (3/16/2019), there are no changes except as documented above.     Continuing to titrate ventilator and aggressive diuresis regimen, multiple titrations of the patient's ongoing sedative plan. This patient is critically ill, at high risk for decompensation leading to worsening vital organ dysfunction and death without critical care interventions.    Discussed patient condition and risk of morbidity and/or mortality with Hospitalist, RN, RT, Pharmacy, Dietary, , Charge nurse / hot rounds, Patient and cardiology     The patient remains critically ill.  Critical care time = 90 minutes in directly providing and coordinating critical care and extensive data review.  No time overlap and excludes procedures.

## 2019-03-17 NOTE — ASSESSMENT & PLAN NOTE
With associated respiratory failure  Continue IV rocephin and azithromycin.   Cultures from admit were negative.

## 2019-03-18 PROBLEM — I50.23 ACUTE ON CHRONIC SYSTOLIC HEART FAILURE (HCC): Status: ACTIVE | Noted: 2019-01-01

## 2019-03-18 PROBLEM — R50.9 FEVER: Status: ACTIVE | Noted: 2019-01-01

## 2019-03-18 PROBLEM — Z71.89 GOALS OF CARE, COUNSELING/DISCUSSION: Status: ACTIVE | Noted: 2019-01-01

## 2019-03-18 NOTE — PROGRESS NOTES
· 2 RN skin check complete with second RN.  · Devices in place: cardiac monitor, SBP cuff, SpO2 probe, ET tube with Catalina portillo, ABRAHAMs, cortrak, CVC.  · Skin assessed under devices and intact. Abdominal redness present.  · The following interventions in place: low air loss bariatric bed, pillows floating extremities, sacral mepilex, q2 turns, ET tube repositioned per RT and RN, baby powder under panus folds for moisture prevention

## 2019-03-18 NOTE — FLOWSHEET NOTE
Adult Ventilation Update    Total Vent Days: 7    Patient Lines/Drains/Airways Status    Active Airway     Name: Placement date: Placement time: Site: Days:    Airway ETT Oral 8.0 03/13/19   0010   Oral   5                             Plateau Pressure (Q Shift): 18 (03/16/19 1829)  Static Compliance (ml / cm H2O): 59 (03/18/19 1612)    Patient failed trials because of Barriers to Wean: FiO2 >60% or PEEP >10 CM H2O (03/18/19 0440)  Barriers to SBT    Length of Weaning Trial                  Cough: Congested;Productive (03/18/19 1612)  Sputum Amount: Small (03/18/19 1612)  Sputum Color: Clear;Pink Tinged;Tan;White (03/18/19 1612)  Sputum Consistency: Thick;Thin (03/18/19 1612)    Mobility  Level of Mobility: Level III (03/18/19 1200)  Activity Performed: Unable to mobilize (03/18/19 1200)  Time Activity Tolerated: 8 min (03/17/19 2000)  Assistance / Tolerance: Assistance of Two or More (03/17/19 2000)  Pt Calls for Assistance: No (03/18/19 0000)  Staff Present for Mobilization: RN;MD;CNA;RT (03/17/19 2000)  Reason Not Mobilized: Unstable condition (03/18/19 1200)  Mobilization Comments: FiO2 100%, PEEP of 16 (03/18/19 1200)    Events/Summary/Plan: Flolan changed. Tubing and nebulizer also changed.  Pt/vent checked.  Family with pt. (03/18/19 1612)

## 2019-03-18 NOTE — HEART FAILURE PROGRAM
Cardiovascular Nurse Navigator () Advanced Heart Failure Program Inpatient Progress Note:     Please note this is an acutely decompensated HFrEF pt, Cardiology following.      Outpatient cardiology: none  Residence: [place]  Insurance coverage: none     As of 3/18/19, pt remains on ventilator, dispo undetermined. Therefore, scheduling of 7 day HF appt and patient education deferred at this time.     Will monitor for improvement in clinical status and evolving dispo plan.       Thank you and please call KUSH eHrnández with any questions: 1964.      Addendum 03.19.19 Code status has been changed to comfort care. HF f/u not indicated at this time.

## 2019-03-18 NOTE — PROCEDURES
Procedure Note    Date: 3/15/2019  Time: 2330    Procedure: Bronchoscopy with bronchoalveolar lavage and therapeutic aspiration of secretions    Indication: Persistent atelectasis/pneumonia  Consent: Informed consent obtained from patient or designated decision maker after explaining the benefits/risks of the procedure including but not limited to bleeding, infection, airway trauma or loss therof, pneumothorax/hemothorax, arrythmia, or death. Patient or surrogate expressed understanding and agreement and signed consent which can be found in the patient's chart.    Procedure: After obtaining consent, a time-out was performed. Respiratory therapy and nursing at bedside throughout procedure. Patient provided sedation and analgesia throughout the procedure. Placed on full ventilator support with an FiO2 of 100% throughout the procedure. Using a fiberoptic bronchoscope, trachea entered via 8.0 endotracheal tube.  5 mL of local anesthetic sprayed at the isabel (2% lidocaine) achieving appropriate comfort level for patient. Airways visualized directly and the following intervention was performed: diagnostic and therapeutic lavage with all areas of lungs suctioned to clear. Findings as below. Patient tolerated procedure well without any difficulties and left in care of bedside nurse/RT.     Medications: Propofol drip  Findings: Upper airway - Not visualized as bronchoscope passed through ETT.        Trachea to isabel -normal appearing mucosa without lesions or mass, ETT tip measured 4 cm from the isabel.        R proximal and distal airways -normal appearing mucosa without mass/lesion/anatomic variance, secretions: Thick, cloudy, tan, moderate        L proximal and distal airways -normal appearing mucosa without mass/lesion/anatomic variance, secretions: Thick, cloudy, tan, moderate        Samples -bilateral lower lobe bronchoalveolar lavage    Complications: None  CXR (if applicable): Pending    Jeremy Gonda, MD  Critical  Care Medicine

## 2019-03-18 NOTE — PROCEDURES
Central Line Insertion  Date/Time: 3/17/2019 3:15 PM  Performed by: BENJAMÍN GARCIA JR  Authorized by: BENJAMÍN GARCIA JR     Consent:     Consent obtained:  Verbal    Consent given by:  Healthcare agent  Pre-procedure details:     Hand hygiene: Hand hygiene performed prior to insertion      Sterile barrier technique: All elements of maximal sterile technique followed      Skin preparation:  2% chlorhexidine    Skin preparation agent: Skin preparation agent completely dried prior to procedure    Sedation:     Sedation type: vent sedation.  Anesthesia:     Anesthesia method:  Local infiltration    Local anesthetic:  Lidocaine 1% w/o epi  Procedure details:     Location:  L internal jugular    Site selection rationale:  Existing RIJ to be removed    Patient position:  Flat    Procedural supplies:  Triple lumen    Catheter size:  7 Fr    Landmarks identified: yes      Ultrasound guidance: yes      Sterile ultrasound techniques: Sterile gel and sterile probe covers were used      Number of attempts:  1    Successful placement: yes    Post-procedure details:     Post-procedure:  Dressing applied and line sutured    Assessment:  Blood return through all ports, free fluid flow, no pneumothorax on x-ray and placement verified by x-ray    Patient tolerance of procedure:  Tolerated well, no immediate complications

## 2019-03-18 NOTE — CARE PLAN
Problem: Ventilation Defect:  Goal: Ability to achieve and maintain unassisted ventilation or tolerate decreased levels of ventilator support    Intervention: Support and monitor invasive and noninvasive mechanical ventilation  Adult Ventilation Update    Total Vent Days: 7  Vent: APVCMV 24\450\+12\60%    Patient Lines/Drains/Airways Status    Active Airway     Name: Placement date: Placement time: Site: Days:    Airway ETT Oral 8.0 03/13/19   0010   Oral   7              Plateau Pressure (Q Shift): 18 (03/16/19 1829)  Static Compliance (ml / cm H2O): 40 (03/18/19 0440)    Patient failed trials because of Barriers to Wean: FiO2 >60% or PEEP >10 CM H2O (03/18/19 0440)    Cough: Productive (03/18/19 0400)  Sputum Amount: Small (03/18/19 0205)  Sputum Color: White;Clear;Tan (03/18/19 0205)  Sputum Consistency: Thin;Thick (03/18/19 0205)    Mobility  Level of Mobility: Level II (03/17/19 2000)  Activity Performed: Edge of bed (03/17/19 2000)  Time Activity Tolerated: 8 min (03/17/19 2000)  Assistance / Tolerance: Assistance of Two or More (03/17/19 2000)  Pt Calls for Assistance: No (03/18/19 0000)  Staff Present for Mobilization: RN;MD;CNA;RT (03/17/19 2000)  Reason Not Mobilized: Unstable condition (03/17/19 0800)  Mobilization Comments: APRV, Flolan, desat with movement (03/17/19 0800)    Events/Summary/Plan: ABG (03/18/19 9865) Pt currently stable on vent.

## 2019-03-18 NOTE — PROGRESS NOTES
Received bedside report from LAVELL Glaser. Assumed care of patient. Continuous infusions and restraints verified during during bedside report

## 2019-03-18 NOTE — DISCHARGE PLANNING
Care Transition Team Assessment    In the case of an emergency, pt's NOK is Marysol Camacho (Eldest Daughter) [phone].     This RNCM spoke with Marysol over the phone and obtained the information used in this assessment; as patient is currently in critical condition, requiring life-support. Marysol verified accuracy of facesheet. Pt lives with his daughter, Sugey, and her family in a 1 story house. Pt uses the [facility] in [place] for all of his medication needs. Prior to current hospitalization, pt was completely independent with ADLS/IADLS. [facility]. [facility] is currently unemployed and collects $1600/month from unemployment. Daughter denies the pt having a h/o of SA or . Pt has wonderful support from his adult children. Pt does not currently have an AD and family members are not at bedside for this RN CM to offer AD booklet. Pt's discharge disposition is currently in process.     Upon D/C: Pt's daughter, Marysol, will provide transport home if applicable.       Information Source  Orientation : Unable to Assess (Intubated, Sedated.)  Information Given By: Relative (Oldest Daughter)  Informant's Name: Marysol  Who is responsible for making decisions for patient? : Adult child  Name(s) of Primary Decision Maker: Marysol Camacho    Readmission Evaluation  Is this a readmission?: No    Elopement Risk  Legal Hold: No  Ambulatory or Self Mobile in Wheelchair: No-Not an Elopement Risk  Elopement Risk: Not at Risk for Elopement    Interdisciplinary Discharge Planning  Primary Care Physician: Amy  Lives with - Patient's Self Care Capacity: Adult Children (Able to care for himself)  Support Systems: Children  Housing / Facility: 1 Story House  Do You Take your Prescribed Medications Regularly: Yes  Able to Return to Previous ADL's: Future Time w/Therapy  Mobility Issues: No  Prior Services: None  Patient Expects to be Discharged to::  (TBD.)  Assistance  Needed: Unknown at this Time  Durable Medical Equipment: Not Applicable    Discharge Preparedness  What is your plan after discharge?: Uncertain - pending medical team collaboration  What are your discharge supports?: Child  Prior Functional Level: Ambulatory, Drives Self, Independent with Activities of Daily Living, Independent with Medication Management  Difficulity with ADLs: None  Difficulity with IADLs: None    Functional Assesment  Prior Functional Level: Ambulatory, Drives Self, Independent with Activities of Daily Living, Independent with Medication Management    Finances  Financial Barriers to Discharge: Yes  Average Monthly Income: 1600 $  Source of Income: Other (comment) (Unemployment.)  Prescription Coverage: Yes         Values / Beliefs / Concerns  Values / Beliefs Concerns : No    Advance Directive  Advance Directive?: None  Advance Directive offered?:  (Patient intubated, sedated. )         Psychological Assessment  History of Substance Abuse: None  History of Psychiatric Problems: No  Non-compliant with Treatment: No  Newly Diagnosed Illness: Yes    Discharge Risks or Barriers  Discharge risks or barriers?: Uninsured / underinsured, Post-acute placement / services, Complex medical needs, Other (comment) (Unemployed)  Patient risk factors: Complex medical needs, Uninsured or underinsured    Anticipated Discharge Information  Anticipated discharge disposition:  (To Be Determined.)

## 2019-03-18 NOTE — CARE PLAN
Problem: Safety  Goal: Will remain free from injury  Outcome: PROGRESSING SLOWER THAN EXPECTED  Patient does not follow commands or actively participate     Problem: Fluid Volume:  Goal: Will maintain balanced intake and output  Outcome: PROGRESSING AS EXPECTED  Lasix gtt titrated to hourly urine output, I/O measured q2 hours

## 2019-03-18 NOTE — CARE PLAN
Problem: Nutritional:  Goal: Nutrition support tolerated and meeting greater than 85% of estimated needs  Outcome: MET Date Met: 03/18/19  TF is running with Peptamen Intense VHP at goal rate of 75 mL/hr.  Order in for protein flushes 5x/day but nursing is not giving.    Pt no longer on propofol, but do not need to increase TF as current kcals within est needs.   RD d/c'd the protein flushes as TF at goal provides 2.1 gm pro/kg IBW per day which should be adequate.   Discussed metabolic cart study with MD to better determine kcal needs in this morbidly obese critically ill pt; however, pt is not stable enough for an accurate study at this time.  Will order when appropriate.

## 2019-03-18 NOTE — ASSESSMENT & PLAN NOTE
Further discussion today after rounds on patient and current therapy and plan. Discussed plan that I extensively discussed case with cardiology,  infectious disease consultation and plan for a trial of aggressive therapy.     Family went on to discuss more about the patient his wishes and his tragic loss of his parents to cancer at an early age and his fear and avoidence of health care and medical services. The family was unanimous that this type of care and recurrent care with advanced heart failure would not be what he would want. After a long discussion with all family members they all agreed they wanted to focus on his comfort.

## 2019-03-18 NOTE — DISCHARGE PLANNING
Anticipated Discharge Disposition: TBD    Action: This RN CM spoke with Marysol pt's eldest daughter. Patient is unemployed and does not have any insurance at this time.     Barriers to Discharge: Payer Source.    Plan: This RN CM emailed [facility] for insurance screen.

## 2019-03-18 NOTE — PROGRESS NOTES
Dr. Gonda updated that lasix gtt titrated up during entire shift with little response of urine output. Per MD, keep gtt at 10 (unless indicated by titration parameters) and MD ordered Diuril.

## 2019-03-18 NOTE — PROGRESS NOTES
Patient turned for sheet change. Patient became dark purple, biting on ET tube, peak pressures 40s, and O2 sat decreasing. Once on his back, patient noted to be in bigeminy. Pulse present. Patient sat up. EKG obtained but patient converted prior to 12 lead. Labs sent. Dr. Gonda notified. No orders received.

## 2019-03-18 NOTE — PROGRESS NOTES
"Critical Care Progress Note    Date of admission  3/15/2019    Chief Complaint  48 y.o. male admitted 3/15/2019 with VDRF, AECHF    Hospital Course  \"48 y.o. male who presented 3/15/2019 with past medical history significant for hypertension and morbid obesity who was admitted to [facility] on 3/12 for altered mental status times 24 hours.  He was slurring his words.  Daughter stated that patient had been depressed for a few months and had stopped taking care of himself.  He had recently been diagnosed with diabetes but lost his insurance.  He had about a 50 pound weight gain with lower extremity edema. In the emergency department he was altered and hypoxic with oxygen saturation of 60%.  He underwent CT imaging of his chest/abdomen/pelvis and was admitted to the hospital.  He developed hypercarbic respiratory failure and was intubated with some difficulty.  His hospital course was complicated with sedation issues trialing Precedex as well as propofol and fentanyl and pushes of Ativan.  A tox screen was negative and he was transiently on antibiotics including antiviral for possible encephalitis.  He has remained on vancomycin and Zosyn since then.  Blood cultures from admission date are negative. He was started on a dopamine drip transiently after an echocardiogram was performed revealed an ejection fraction of 25% with severely dilated right ventricle as well.  He was aggressively diuresed with a negative fluid loss of 11 L since admission but started to have increasing in serum bicarbonate levels as well as his pH.  He was seen by cardiology but given lack of pulmonary and critical care coverage decision was made to transfer patient to [facility] for higher level of care.  In route to the hospital patient's PEEP had to be increased from 10-12 given persistent desaturations.\"    Interval Problem Update  Reviewed last 24 hour events:  On hypoxic when laid flat  Neuro: open eyes some tracking, moves all "   HR: snr   SBP: 's  Tmax: 102.2  GI: BM prior Pept at goal  UOP: lasix gtt 1.7  Lines: left IJ central cohn  Resp: vent 7 24 450 12 100% 8.0 flolan minimal secreations bronch yesterday    Vte: lovenox  PPI/H2:pepcid  Antibx: day 5/5 ceftriaxone azithro   Mg replaced    Today still persistently hypoxic tried on APRV was not ventilating MV only 2.8L so had to transition to VC with peep increased to 18 still on 100%  Will order echo with bubble study, febrile will check procal, blood cultured down, u/a negative 10-20 wbc, no secretions bal.   Family coming may need repeat bronch today and further discussions of wishes.  Will give lasix bolus with gtt this afternoon and was given diuril this am.    Will give more oral potassium with replacement.   Decrease Fentanyl gtt.   Family meeting to discussed current critical nature of illness.         Review of Systems  Review of Systems   Unable to perform ROS: Intubated        Vital Signs for last 24 hours   Temp:  [37.1 °C (98.8 °F)-38.8 °C (101.8 °F)] 38.8 °C (101.8 °F)  Pulse:  [] 104  Resp:  [12-58] 24  SpO2:  [89 %-96 %] 95 %    Hemodynamic parameters for last 24 hours  CVP:  [12 MM HG-15 MM HG] 12 MM HG    Respiratory Information for the last 24 hours  Marin Vent Mode: APVCMV  Rate (breaths/min): 24  Vt Target (mL): 450  PEEP/CPAP: 12  FiO2: 60  P Support: 0  P MEAN: 20  Control VTE (exp VT): 446    Physical Exam   Physical Exam   Constitutional: He appears well-developed and well-nourished. He is intubated.   Sick appearing male.   HENT:   Head: Normocephalic and atraumatic.   Right Ear: External ear normal.   Left Ear: External ear normal.   Nose: Nose normal.   Mouth/Throat: Oropharynx is clear and moist.   ETT in position   Eyes: Pupils are equal, round, and reactive to light. Conjunctivae and EOM are normal.   Neck: Neck supple. No JVD present. No tracheal deviation present.   Right internal jugular central venous catheter   Cardiovascular:  Regular rhythm and intact distal pulses.  Tachycardia present.    Pulmonary/Chest: No accessory muscle usage. He is intubated. He is in respiratory distress. He has rales.   Coarse breath sounds   Abdominal: Soft. Bowel sounds are normal. He exhibits no distension. There is no tenderness. There is no rebound.   Morbidly obese   Musculoskeletal: He exhibits edema. He exhibits no tenderness or deformity.   Left radial art line   Neurological: He is alert. No cranial nerve deficit.   Sedated, seems to track with eyes, moves all extremities.    Skin: Skin is warm and dry. No rash noted.   Multiple tattoos   Psychiatric:   sedated   Nursing note and vitals reviewed.      Medications  Current Facility-Administered Medications   Medication Dose Route Frequency Provider Last Rate Last Dose   • acetaminophen (TYLENOL) tablet 650 mg  650 mg Oral Q4HRS PRN Jeremy M Gonda, M.D.   650 mg at 03/18/19 0412   • magnesium sulfate IVPB premix 2 g  2 g Intravenous Once Ron Tinoco M.D.       • lactulose 20 GM/30ML solution 30 mL  30 mL Enteral Tube TID Jose Parr Jr., D.O.   30 mL at 03/18/19 0516   • enoxaparin (LOVENOX) inj 40 mg  40 mg Subcutaneous Q12HRS Jose Parr Jr., D.O.   40 mg at 03/18/19 0517   • fentaNYL (SUBLIMAZE) injection  mcg   mcg Intravenous Q15 MIN PRN Jose Parr Jr., D.O.   100 mcg at 03/18/19 0400   • K+ Scale: Goal of 4.5  1 Each Intravenous Q6HRS Jose Parr Jr., D.O.   1 Each at 03/18/19 0233   • fentaNYL (SUBLIMAZE) 50 mcg/mL in 50mL   Intravenous Continuous Jose Parr Jr. D.O. 8 mL/hr at 03/18/19 0636 400 mcg/hr at 03/18/19 0636   • famotidine (PEPCID) tablet 20 mg  20 mg Enteral Tube Q12HRS Jose Parr Jr., D.O.   20 mg at 03/18/19 0516    Or   • famotidine (PEPCID) injection 20 mg  20 mg Intravenous Q12HRS Jose Parr Jr., D.O.       • senna-docusate (PERICOLACE or SENOKOT S) 8.6-50 MG per tablet 2 Tab  2 Tab Enteral Tube BID Jose Parr Jr., D.O.   2  Tab at 03/18/19 0515    And   • polyethylene glycol/lytes (MIRALAX) PACKET 1 Packet  1 Packet Enteral Tube QDAY PRN Jose Parr Jr., D.O.        And   • magnesium hydroxide (MILK OF MAGNESIA) suspension 30 mL  30 mL Enteral Tube QDAY PRN Jose Parr Jr., D.O.        And   • bisacodyl (DULCOLAX) suppository 10 mg  10 mg Rectal QDAY PRN Jose Parr Jr., D.O.       • Respiratory Care per Protocol   Nebulization Continuous RT Jeremy M Gonda, M.D.       • MD Alert...ICU Electrolyte Replacement per Pharmacy   Other PHARMACY TO DOSE Jeremy M Gonda, M.D.       • Pharmacy Consult: Enteral tube insertion - review meds/change route/product selection  1 Each Other PHARMACY TO DOSE Jeremy M Gonda, M.D.       • ipratropium-albuterol (DUONEB) nebulizer solution  3 mL Nebulization Q2HRS PRN (RT) Jeremy M Gonda, M.D.       • furosemide (LASIX) 100 mg in  mL infusion  1-10 mg/hr Intravenous Continuous Jeremy M Gonda, M.D. 9 mL/hr at 03/18/19 0600 9 mg/hr at 03/18/19 0600   • epoprostenol (FLOLAN) 1.5 mg in glycine diluent for flolan 50 mL for Inhalation  0.01-0.05 mcg/kg/min Inhalation Continuous Jeremy M Gonda, M.D. 12 mL/hr at 03/18/19 0406 0.033 mcg/kg/min at 03/18/19 0406   • azithromycin (ZITHROMAX) 500 mg in D5W 250 mL IVPB  500 mg Intravenous Q24HRS Jeremy M Gonda, M.D.   Stopped at 03/18/19 0736   • norepinephrine (LEVOPHED) 8 mg in  mL Infusion  0-30 mcg/min Intravenous Continuous Jeremy M Gonda, M.D.   Stopped at 03/17/19 0005   • insulin regular (HUMULIN R) injection 2-9 Units  2-9 Units Subcutaneous Q6HRS Jeremy M Gonda, M.D.   2 Units at 03/18/19 0535    And   • dextrose 50% (D50W) injection 25 mL  25 mL Intravenous Q15 MIN PRN Jeremy M Gonda, M.D.           Fluids    Intake/Output Summary (Last 24 hours) at 03/18/19 0731  Last data filed at 03/18/19 0600   Gross per 24 hour   Intake          1622.69 ml   Output             3435 ml   Net         -1812.31 ml       Laboratory  Recent Labs       03/17/19   0506  03/17/19   1252  03/18/19   0419   ISTATAPH  7.386*  7.425  7.399*   ISTATAPCO2  59.4*  54.2*  49.9*   ISTATAPO2  88*  96*  60*   ISTATATCO2  37*  37*  32   [id number]  96  97  90*   ISTATARTHCO3  35.6*  35.5*  30.8*   ISTATARTBE  8*  9*  5*   ISTATTEMP  37.5 C  37.7 C  38.9 C   ISTATFIO2  50  100  60   ISTATSPEC  Arterial  Arterial  Arterial   ISTATAPHTC  7.378*  7.414  7.371*   [id number]  91*  100*  68         Recent Labs      03/15/19   2300  03/16/19   0651   03/17/19 0349 03/17/19   1802  03/18/19   0150  03/18/19   0402   SODIUM  146*  148*   --   149*   --    --    --   148*   POTASSIUM  3.2*  3.4*   < >  3.8   < >  3.1*  3.4*  3.6   CHLORIDE  102  101   --   103   --    --    --   107   CO2  35*  36*   --   39*   --    --    --   32   BUN  22  23*   --   24*   --    --    --   22   CREATININE  1.17  1.22   --   1.29   --    --    --   0.98   MAGNESIUM  1.8  2.2   --   2.0   --    --    --   1.8   PHOSPHORUS  4.6*  5.2*   --   3.1   --    --    --    --    CALCIUM  8.3*  8.5   --   8.4*   --    --    --   8.8    < > = values in this interval not displayed.     Recent Labs      03/15/19   2300  03/16/19   0651  03/17/19 0349 03/18/19   0402   ALTSGPT  12   --    --   13   ASTSGOT  21   --    --   27   ALKPHOSPHAT  57   --    --   55   TBILIRUBIN  1.2   --    --   1.2   LIPASE   --    --    --   36   PREALBUMIN  7.0*   --    --    --    GLUCOSE  119*  135*  163*  170*     Recent Labs      03/15/19   2300  03/16/19   0459  03/17/19   0349  03/18/19   0402   WBC  7.7  11.1*  9.4  8.4   NEUTSPOLYS  75.00*  83.80*  80.60*  72.50*   LYMPHOCYTES  13.20*  6.80*  6.20*  12.20*   MONOCYTES  8.50  7.00  10.90  12.20   EOSINOPHILS  2.10  1.30  1.20  2.30   BASOPHILS  0.90  0.60  0.70  0.60   ASTSGOT  21   --    --   27   ALTSGPT  12   --    --   13   ALKPHOSPHAT  57   --    --   55   TBILIRUBIN  1.2   --    --   1.2     Recent Labs      03/15/19   2300  03/16/19   0459  03/17/19   0349   03/18/19   0402   RBC  6.21*  6.07  5.54  5.56   HEMOGLOBIN  18.5*  18.5*  16.5  16.6   HEMATOCRIT  58.8*  58.1*  54.2*  54.9*   PLATELETCT  193  219  171  181   PROTHROMBTM  16.6*   --    --    --    APTT  30.8   --    --    --    INR  1.33*   --    --    --        Imaging  X-Ray:  I have personally reviewed the images and compared with prior images.  EKG:  I have personally reviewed the images and compared with prior images.  Echo:   Reviewed    Assessment/Plan  Acute on chronic systolic congestive heart failure (HCC)- (present on admission)   Assessment & Plan    Decompensated, biventricular failure  Awaiting cardiology consultation  Continue forced diuresis with Lasix infusion  Inhaled Flolan for right ventricle afterload reduction  Blood pressure does not allow for beta-blocker or ACE inhibitor at this time  No longer requiring inotropic support with levophed  Cardiology consulted, no interventions planned at this time     Acute hypoxemic respiratory failure (HCC)- (present on admission)   Assessment & Plan    Intubated outside facility on March 12, 2019  Currently dyssynchronous with APRV  RT/O2 protocols  Daily and PRN ABGs  Titration of ventilator therapy based on ABGs and patient's status  Sedation as tolerated/indicated  Daily CXR  HOB >30 degrees and peridex for VAP prevention  Pepcid for GI prophylaxis  SAT/SBT when able (ABCDEF Bundle)  Early mobility  Transitioned from APRV to CMV with high PEEP, will continue Flolan.  Patient may tolerate APRV again if needed and sedated heavily     Edema- (present on admission)   Assessment & Plan    Forced diuresis and monitor     Class 3 severe obesity due to excess calories with serious comorbidity and body mass index (BMI) of 50.0 to 59.9 in adult (HCC)- (present on admission)   Assessment & Plan    Encourage behavior modification and weight loss once extubated, family informed of the urgency of his weight loss  Nutritionist consultation  Start enteral nutrition  with low calorie formula     Pneumonia due to infectious organism- (present on admission)   Assessment & Plan    No healthcare associated risk factors prior to admission  Continue Rocephin and azithromycin for 2 additional days for a 5-day total course of antibiotics  Bronchoscopy with BAL, follow-up on cultures  pro-calcitonin elevated  UA at outside hospital unremarkable  Continues to be febrile     Demand ischemia (HCC)- (present on admission)   Assessment & Plan    With mildly elevated troponin upon arrival at outside hospital -down trended  Continue aspirin and statin     Acute encephalopathy- (present on admission)   Assessment & Plan    Metabolic encephalopathy  Sedation vacation as tolerates  CNS infection considered unlikely given improvement in encephalopathy and no meningismus on exam    Keep patient awake during the day and avoid daytime naps. Remove all unnecessary lines (central lines, peripheral IVs, feeding tubes, cohn catheters). Avoid polypharmacy, frequent re-orientation, maximize family time at bedside, use glasses and hearing aids if needed, treat pain, encourage ambulation, minimize benzos/anticholinergic agents.          Fever- (present on admission)   Assessment & Plan    Likely infectious, pneumonia vs other  Continue current antimicrobial course with low threshold to expand regimen  Follow-up cultures; currently blood cultures from outside hospital showed no growth to date, BAL with no growth to date     Goals of care, counseling/discussion- (present on admission)   Assessment & Plan    Palliative care consulted for goals of care discussion with family given prior documentation of patient's wishes     Hypokalemia- (present on admission)   Assessment & Plan    Likely to worsen while on Lasix infusion  Continue K scale     Hyperglycemia- (present on admission)   Assessment & Plan    Poorly controlled, non-compliant at home  Goal blood glucose 120-180  sliding scale insulin,  accuchecks  hypoglycemia protocol  9.2 A1c       Hypernatremia- (present on admission)   Assessment & Plan    Continue free water enterally and monitor while ongoing needs for diuresis     Hypomagnesemia- (present on admission)   Assessment & Plan    IV repletion and monitor closely  Electrolyte placement protocol     Polycythemia vera (HCC)- (present on admission)   Assessment & Plan    Secondary to hypoxemia and obesity  Monitor, may need therapeutic phlebotomy          VTE:  Lovenox  Ulcer: H2 Antagonist  Lines: Central Line  Ongoing indication addressed, Arterial Line  Ongoing indication addressed and Arnold Catheter  Ongoing indication addressed    I have performed a physical exam and reviewed and updated ROS and Plan today (3/18/2019). In review of yesterday's note (3/17/2019), there are no changes except as documented above.     Discussed patient condition and risk of morbidity and/or mortality with Hospitalist, RN, RT, Pharmacy, Dietary, , Charge nurse / hot rounds, Patient and cardiology     The patient remains critically ill on maximal ventilatory support, flolan and lasix gtt with active titration.  Critical care time = 120 minutes in directly providing and coordinating critical care and extensive data review.  No time overlap and excludes procedures.

## 2019-03-18 NOTE — PROCEDURES
Procedures  Procedure Note    Date: 3/17/2019  Time: 1410    Procedure: Bronchoscopy    Indication: Hypoxic respiratory failure, copious mucus production, worsening PF ratio    Consent: Informed consent obtained from patient or designated decision maker after explaining the benefits/risks of the procedure including but not limited to bleeding, infection, airway trauma or loss therof, pneumothorax/hemothorax, arrythmia, or death. Patient or surrogate expressed understanding and agreement and signed consent which can be found in the patient's chart.    Procedure: After obtaining consent, a time-out was performed. Respiratory therapy and nursing at bedside throughout procedure. Patient provided sedation and analgesia throughout the procedure. Placed on full ventilator support with an FiO2 of 100% throughout the procedure. Using a fiberoptic bronchoscope, trachea entered via ETT.  10 mL of local anesthetic sprayed at the isabel (2% lidocaine) achieving appropriate comfort level for patient. Airways visualized directly and the following intervention was performed: Suctioning of obstructing mucous plugs. Findings as below. Patient tolerated procedure well without any difficulties and left in care of bedside nurse/RT.     Medications: Fentanyl, propofol, lidocaine  Findings: Upper airway - Not visualized as bronchoscope passed through ETT.        Trachea to isabel -inflamed, friable and edematous appearing mucosa without lesions or mass, ETT tip measured 2 cm from the isabel.        R proximal and distal airways -inflamed, friable and edematous appearing mucosa without mass/lesion/anatomic variance, secretions: Moderate, white, thick mucus in the right mainstem, right lower lobe and right middle lobes.  Lavaged all bronchi until clear        L proximal and distal airways -inflamed, friable and edematous appearing mucosa without mass/lesion/anatomic variance, secretions: Moderate, white, thick mucus in the left mainstem,  left lower lobe.  Lavaged all bronchi until clear        Samples -none    Complications: None  CXR (if applicable): Not applicable    Jose Parr, DO  Critical Care Medicine

## 2019-03-18 NOTE — PROGRESS NOTES
Dr. Tinoco updated on patient maintaining a temperature of 38.9. Orders to place cooling blanket on patient. Tylenol already given, see MAR.

## 2019-03-18 NOTE — PROGRESS NOTES
Dr. Lopes paged in regards to patient SpO2 remaining 84-86%. Dr. Lopes to bedside. Ventilator settings adjusted with MD and RT. New medication orders received, see MAR.

## 2019-03-18 NOTE — ASSESSMENT & PLAN NOTE
Likely infectious vs drug no history of recent drug or alcohol abuse.   Possible drug fever related.   Will discuss with ID today for consult since today is last day of antibiotic coverage with negative pan CT and cultures thus far.

## 2019-03-19 NOTE — PROGRESS NOTES
Received bedside report from LAVELL Diaz. Assumed care of patient. All continuous infusions and restraints verified. Assumed care of patient. Discussed plan of care with family and all questions answered.

## 2019-03-19 NOTE — PROGRESS NOTES
Night intensivist note    Called to bedside because pt's recent CXR showed complete opacification of right hemithorax. Pt was turned and developed a quick drop of his oxygen saturation to 70% and required about 30 mins of bag masking to keep above 86%. Pt was placed back to CMV PEEP 18, FiO2 100%.     I came to bedside and sat was in 84-86%. BP was in 80/30s with MAP in 40s. We started pt on norepinephrine gtt to keep MAP >65. We attempted APRV with P high at 34, P low 0, Time high 4.5 secs and T low 0.5, but pt got de-recruited and desated to 72% and requiring continuous bag-masking.     We were able suction using NT suction catheter during the bag masking and suctioned some blood tinged secretions. Pt is sedated with propofol and fentanyl. Took us about 30 mins to get him back to >88%. Vecuronium 10mg IV x1 was given. Fentanyl 100mcg IV x1 was also given.  I performed quick bronch to see any mucous plugging. No mucous plugging seen. See my bronch procedure note for detail.     After oxygenation was stabilized, we placed pt back to APRV with Phigh 36, Plow 0, Thigh 5, Tlow 0.5, keeping sat >88%  Arterial line was placed.   Repeat ABG.   Continue inhaled flolan  Pt is already on lasix gtt. UOP seems to be decreasing since midnight. Only 40cc in the past 3 hours. Will increase lasix gtt to 10mg/hr    I called and updated daughter, Marysol Camacho at [phone] and updated her about the critical situation. All questions were answered.     Diagnosis:  1. Worsening acute hypoxic resp failure.   2. Biventricular failure    Critical Care time spent: 110 minutes    Jeronimo Garcia DO  Critical Care    Addendum  Repeat ABG was reviewed. PH 7.40/55/311  Will decrease T high to 4.5 and FiO2 slowly to 80%.     Jeronimo Garcia DO

## 2019-03-19 NOTE — PROCEDURES
BRONCHOSCOPY PROCEDURE NOTE      Date: 3/19/2019  Time: 2:04 AM    Time out: not performed due to emergency    Indication: complete right hemithorax    Consent: not obtained due to emergency    Procedure: Bronchoscopy with therapeutic aspiration of secretions    Sedation: propofol, fentanyl    Findings:  Respiratory therapy and nursing at bedside throughout procedure. Patient is already on sedation and analgesia throughout the procedure. We are ambu-bagging the patient as I'm bronching due to pt's profound hypoxia. Prior to the start of the procedure, we were ambu-bagging the patient to keep oxygen saturation >88%. It took about 20-30 mins before we were able to achieve the desired oxygen sat. We also suctioned using NT suction catheter.     Once oxygen sat was achieved, I quickly entered the bronchoscope via ET tube. I did not see any mucous plugging in bilateral lobes. Some suctioned trauma noted in the RML and RLL. Foamy thin secretions were noted when pt was coughing which was suctioned as much as I could. RUL was open, no secretions. Left main bronchus, lingula, COLETTE and LLL were opened. Noted foamy thin secretions noted when pt was coughing which was suctioned.     The bronchial mucosa was hyperemic throughout, left more than the right. No purulence.     Complications:   No complications. Oxygen sat was maintained >90% throughout the procedure. .     Estimated blood loss: none      Jeronimo Garcia D.O.  Critical Care Medicine

## 2019-03-19 NOTE — PROCEDURES
Date: 3/19/2019    Procedure: Bronchoscopy with Therapeutic suctioning and BAL    Indication: Infiltrate/atlectasis    Physician:  Ron Tinoco M.D.    Consent:  Family at bedside sister and daughter discussed.     Procedure:  A time out occurred with the patient name, medical record number, allergies, and consent with right procedure and indication with bedside nurse and if able the patient. The patient was connected to a monitor and had continuous pulse oximeter. The patient was sedated with 100mcg of fentanyl and on fentanyl gtt and propofol in addition to lidocaine of 6ml. The bronchoscope was insert down the left and right bronchi to the terminal bronchioles. Therapeutic suction of secretion was provided. A BAL was preformed in the RLL after injecting with small aliquots to a total of 30ml. The patient tolerated the procedure without any immediate complications with minimal <5ml of blood loss.     Findings inflamed airways diffusely with minimal edema of airway some mild bronchotracheal malacia with no purulent or mucous plug removed.     Ron Tinoco MD  Critical Care Medicine

## 2019-03-19 NOTE — PROGRESS NOTES
Cardiology Follow Up Progress Note    Date of Service  3/19/2019    Attending Physician  Ron Tinoco M.D.    Chief Complaint   Dyspnea, intubated.    HPI  Sami Camacho is a 48 y.o. male admitted 3/15/2019 with ELLIOT, non-compliance to medical therapy and ELLIOT treatment, HTN, obesity, now with respiratory failure requiring intubation.    Interim Events  No acute events overnight.  No telemetry events.    Still + fevers.  Still intubated.    Review of Systems  Review of Systems   Unable to perform ROS: Intubated       Vital signs in last 24 hours  Temp:  [37.6 °C (99.7 °F)-38.6 °C (101.5 °F)] 38.6 °C (101.5 °F)  Pulse:  [] 79  Resp:  [11-32] 12  SpO2:  [86 %-100 %] 98 %    Physical Exam  Physical Exam  Constitutional:   Intubated status  HENMT:  Trach tube is in place properly  Eyes: No discharge.  Neck:  Unable to assess for JVD due to intubated status  Cardiovascular: regular rhythm, No murmurs, No rubs, No gallops.   Extremities with no significant pitting edema   Lungs:  +BS anteriorly  Abdomen: no distention  Skin: Warm to touch.   Neurologic: intubated status  Psychiatric: intubated status    Lab Review  Lab Results   Component Value Date/Time    WBC 10.0 03/19/2019 04:15 AM    RBC 5.52 03/19/2019 04:15 AM    HEMOGLOBIN 16.4 03/19/2019 04:15 AM    HEMATOCRIT 53.5 (H) 03/19/2019 04:15 AM    MCV 96.9 03/19/2019 04:15 AM    MCH 29.7 03/19/2019 04:15 AM    MCHC 30.7 (L) 03/19/2019 04:15 AM    MPV 11.1 03/19/2019 04:15 AM      Lab Results   Component Value Date/Time    SODIUM 145 03/19/2019 01:50 AM    POTASSIUM 3.6 03/19/2019 07:35 AM    CHLORIDE 104 03/19/2019 01:50 AM    CO2 36 (H) 03/19/2019 01:50 AM    GLUCOSE 136 (H) 03/19/2019 01:50 AM    BUN 27 (H) 03/19/2019 01:50 AM    CREATININE 1.17 03/19/2019 01:50 AM      Lab Results   Component Value Date/Time    ASTSGOT 27 03/18/2019 07:37 AM    ALTSGPT 13 03/18/2019 07:37 AM     Lab Results   Component Value Date/Time    TRIGLYCERIDE 121 03/17/2019  12:47 AM    TROPONINI 0.19 () 03/13/2019 12:00 PM             Cardiac Imaging and Procedures Review  EKG:  My personal interpretation of the EKG dated 03/18/2019 is sinus rhythm without ACS.    Echocardiogram:  03/13/2019 LVEF of 25%, right heart failure.    Cardiac Catheterization:  None.    Imaging  Chest X-Ray:  Right lung white out likely related to mucus plug.    Stress Test:  None.    Assessment/Plan  Active Problems:    Acute hypoxemic respiratory failure (HCC) POA: Yes    Acute encephalopathy POA: Yes    Demand ischemia ([facility]) POA: Yes    Pneumonia due to infectious organism POA: Yes    Class 3 severe obesity due to excess calories with serious comorbidity and body mass index (BMI) of 50.0 to 59.9 in adult ([facility]) POA: Yes    Edema POA: Yes    Polycythemia vera ([facility]) POA: Yes    Hypomagnesemia POA: Yes    Hypernatremia POA: Yes    Hyperglycemia POA: Yes    Type 2 diabetes mellitus with hyperglycemia, without long-term current use of insulin ([facility]) POA: Yes    Hypokalemia POA: Yes    Goals of care, counseling/discussion POA: Yes    Fever POA: Yes    Acute on chronic systolic heart failure (HCC) POA: Unknown  Resolved Problems:    * No resolved hospital problems. *    Poor prognosis overall.  No further cardiac work up at this time.  Patient does not fit a picture of heart failure exacerbation clinically at this time.  There is strong suspicion for septic shock picture given fever spikes and physical exam findings of warm to touch in LEs in setting of shock.  Agreed with bronchoscopy and antibiotics.  Continue supportive care.  Optimize electrolytes to keep Mg above 2.5 and Potassium above 4.5.  Once extubated and out of ICU, could pursue outpatient ischemic work up.    Will sign off at this time, please call us with further questions.  Patient will be followed in the outpatient cardiology clinic for further cardiac care if he made it out of ICU without neurological deficits.    Thank you for referring this  patient to our cardiology service.    Mei Bowman MD.   [facility].

## 2019-03-19 NOTE — PROCEDURES
ULTRASOUND-GUIDED ARTERIAL LINE PLACEMENT PROCEDURE NOTE      Date: 3/19/2019  Time: 2:13 AM    Time out: not done due to emergency.     Indication: profound acute hypoxic resp failure, need frequent ABGs and real-time BP monitoring    Consent: not obtained due to emergency    Procedure: Arterial line    Location: left radial artery    Sedation: propofol, fentanyl    Procedure note:  The area of interest was identified and swabbed with chlorhexidine. Patient was then draped in sterile fashion. All sterile precautions were followed including hand hygiene, sterile glove and gown, caps and mask, chlorhexidine skin prep.  A 20 gauge needle with arterial catheter was inserted using Seldinger technique with ultrasound guidance. The catheter was then secured to the skin with sutures. Line was leveled and zeroed. Good arterial waveform was noted on the monitor.     Number of attempts: 1    Complications: None. The vital signs remained stable throughout the procedure.     Estimated blood loss: very minimal    Jeronimo Garcia D.O.  Critical Care

## 2019-03-19 NOTE — PROGRESS NOTES
Monitor Summary  Sinus Rhythm  Rate 70-80s  .16/.08/.36    12 Hour Chart Check complete at bedside.

## 2019-03-19 NOTE — PROGRESS NOTES
2200- Discussion had with this RN, RT Erazo and several family member including pt's sister Alesia regarding stimulation of pt. Pt becoming increasingly agitated and CPOT increasing while 5-6 family members come in and touch/speak to him at one time. Educated that the pt needs rest and less stimulation as he bit through the bite block on the ET tube and is restless. Family understands and Alesia agrees to have no more than 2 family members in the room at one time in order to promote rest and healing of pt.     2200- Pt bit through bite block on ET tube and close to damaging ET tube. Dr Garcia notified. Bite block assessed and replaced by RT Erazo.    2300- Pt maintaining O2 saturation of 74-80%. RT Castro began to manually bag the patient until saturation slowly came up to 88%. Stat cxr ordered.    0000- Pt slow to recover from low O2 sats remaining 87-89%. Cxr showed complete opacification of right hemithorax. Dr Garcia notified, received order for RT to change vent settings to APRV and prepare for a bronchoscopy. Dr Garcia came to bedside with pts SBP 80s and MAP of 40s. Pt was started on Levophed to keep MAP>65. After pt was switched to APRV, O2 sats again dropped and maintained in 70s, often requiring manually bagging. NT suctioning was performed and helped to bring O2 saturations up to 88-94% after 30-40 minutes. Vecuronium 10mg IV and Fentanyl 100mcgIV given before bronchoscopy per orders from Dr Garcia.  0100- O2 stabilized after bronchoscopy. Pt placed on APRV. UO slowed down since 0000, lasix gtt increased to 10mg/hr per Dr Garcia.

## 2019-03-19 NOTE — WOUND TEAM
Wound consult placed for BMS system. Per consult comment and chart review. BMS was inserted by NOC obey requesting maintenance order. Rectal tube care order placed. Consult completed.

## 2019-03-19 NOTE — ASSESSMENT & PLAN NOTE
Patient with biventricular heart failure EF 25% RV dilated LA not dilated.   Continue force diuresis  Avoid hypotension for right heart coronary perfusion.   Will  Check serial ScVO2 and ABG today, monitor CVP  Discussed with cardiology about renetta with a Phigh of 35 unclear will be able to get any useful information on filling pressures.   Patient is not in cardiogenic shock he is warm on exam  My clinical impression is that he has chronic bi-V failure of unclear ischemic vs etoh abuse with likely both chronic venous hypertension but also with untreated ELLIOT came in with erthyrocytosis and pulmonary hypertension.   Patient not a VAD candidate or heart transplant

## 2019-03-19 NOTE — PROGRESS NOTES
"Critical Care Progress Note    Date of admission  3/15/2019    Chief Complaint  48 y.o. male admitted 3/15/2019 with VDRF, AECHF    Hospital Course  \"48 y.o. male who presented 3/15/2019 with past medical history significant for hypertension and morbid obesity who was admitted to [facility] on 3/12 for altered mental status times 24 hours.  He was slurring his words.  Daughter stated that patient had been depressed for a few months and had stopped taking care of himself.  He had recently been diagnosed with diabetes but lost his insurance.  He had about a 50 pound weight gain with lower extremity edema. In the emergency department he was altered and hypoxic with oxygen saturation of 60%.  He underwent CT imaging of his chest/abdomen/pelvis and was admitted to the hospital.  He developed hypercarbic respiratory failure and was intubated with some difficulty.  His hospital course was complicated with sedation issues trialing Precedex as well as propofol and fentanyl and pushes of Ativan.  A tox screen was negative and he was transiently on antibiotics including antiviral for possible encephalitis.  He has remained on vancomycin and Zosyn since then.  Blood cultures from admission date are negative. He was started on a dopamine drip transiently after an echocardiogram was performed revealed an ejection fraction of 25% with severely dilated right ventricle as well.  He was aggressively diuresed with a negative fluid loss of 11 L since admission but started to have increasing in serum bicarbonate levels as well as his pH.  He was seen by cardiology but given lack of pulmonary and critical care coverage decision was made to transfer patient to [facility] for higher level of care.  In route to the hospital patient's PEEP had to be increased from 10-12 given persistent desaturations.\"    Interval Problem Update  Reviewed last 24 hour events:      Acute hypoxia with right lung white out, emergent bronchoscopy, " bite through  Neuro: AO2-3   HR: 70-90's  SBP: 's levo 5, prop 35  Tmax: 38.6  GI: +BM pept at goal  UOP: 2.5L  Lines: central art cohn  Resp: day 8 aprv fio2 80 flolan minimal secreations   Vte: lovenox  PPI/H2:pepcid  Antibx: finished 5 days, cultures negative, will get ID consult  Lasix 60mg TID diuril BID take off lasix gtt.       Review of Systems  Review of Systems   Unable to perform ROS: Intubated        Vital Signs for last 24 hours   Temp:  [37.6 °C (99.7 °F)-38.6 °C (101.5 °F)] 38.6 °C (101.5 °F)  Pulse:  [] 79  Resp:  [11-32] 12  SpO2:  [86 %-100 %] 98 %    Hemodynamic parameters for last 24 hours  CVP:  [20 MM HG-30 MM HG] 30 MM HG    Respiratory Information for the last 24 hours  Marin Vent Mode: APRV  Rate (breaths/min): 24  Vt Target (mL): 450  PEEP/CPAP: 0  FiO2: 70  P Support: 0  P MEAN: 34  Control VTE (exp VT): 563    Physical Exam   Physical Exam   Constitutional: He appears well-developed and well-nourished. He is intubated.   Sick appearing male.   HENT:   Head: Normocephalic and atraumatic.   Right Ear: External ear normal.   Left Ear: External ear normal.   Nose: Nose normal.   Mouth/Throat: Oropharynx is clear and moist.   ETT in position   Eyes: Pupils are equal, round, and reactive to light. Conjunctivae and EOM are normal.   Neck: Neck supple. No JVD present. No tracheal deviation present.   Right internal jugular central venous catheter   Cardiovascular: Regular rhythm and intact distal pulses.  Tachycardia present.    Pulmonary/Chest: No accessory muscle usage. He is intubated. He is in respiratory distress. He has rales.   Coarse breath sounds   Abdominal: Soft. Bowel sounds are normal. He exhibits no distension. There is no tenderness. There is no rebound.   Morbidly obese   Musculoskeletal: He exhibits edema. He exhibits no tenderness or deformity.   Left radial art line   Neurological: He is alert. No cranial nerve deficit.   Sedated.    Skin: Skin is warm and dry.  No rash noted.   Multiple tattoos   Psychiatric:   Sedated unable to access   Nursing note and vitals reviewed.      Medications  Current Facility-Administered Medications   Medication Dose Route Frequency Provider Last Rate Last Dose   • SODIUM BICARBONATE 8.4 % IV SOLN            • SODIUM BICARBONATE 8.4 % IV SOLN            • SODIUM CHLORIDE 0.9 % IV SOLN            • PHENYLEPHRINE HCL-NACL 1-0.9 MG/10ML-% IV SOSY            • MD ALERT...adult comfort care   Other PRN Ron Tinoco M.D.       • atropine 1 % ophthalmic solution 2 Drop  2 Drop Sublingual Q4HRS PRN Ron Tinoco M.D.       • HYDROmorphone (DILAUDID) injection 2-4 mg  2-4 mg Intravenous Q10 MIN PRN Ron Tinoco M.D.       • LORazepam (ATIVAN) 2 MG/ML oral conc 1 mg  1 mg Sublingual Q HOUR PRN Ron Tinoco M.D.        Or   • LORazepam (ATIVAN) injection 1-2 mg  1-2 mg Intravenous Q10 MIN PRN Ron Tinoco M.D.           Fluids    Intake/Output Summary (Last 24 hours) at 03/19/19 1237  Last data filed at 03/19/19 0800   Gross per 24 hour   Intake           2741.3 ml   Output             6775 ml   Net          -4033.7 ml       Laboratory  Recent Labs      03/19/19   0048  03/19/19   0259  03/19/19   0422   ISTATAPH  7.448  7.405  7.392*   ISTATAPCO2  47.4*  54.8*  60.5*   ISTATAPO2  69  311*  154*   ISTATATCO2  34*  36*  39*   HVFABDY4NAA  94  100*  99   ISTATARTHCO3  32.8*  34.3*  36.8*   ISTATARTBE  7*  8*  9*   ISTATTEMP  37.6 C  38.7 C  38.3 C   ISTATFIO2  100  100  80   ISTATSPEC  Arterial  Arterial  Arterial   ISTATAPHTC  7.439  7.380*  7.373*   [id number]  71  319*  162*         Recent Labs      03/17/19   0349   03/18/19   0402  03/18/19   0737  03/18/19   1318  03/18/19 1956  03/19/19   0150  03/19/19   0735   SODIUM  149*   --   148*  146*  145   --   145   --    POTASSIUM  3.8   < >  3.6  3.2*  3.3*  3.5*  3.6  3.6   CHLORIDE  103   --   107  106  105   --   104   --    CO2  39*   --   32  34*  33   --   36*   --     BUN  24*   --   22  24*  24*   --   27*   --    CREATININE  1.29   --   0.98  1.03  1.13   --   1.17   --    MAGNESIUM  2.0   --   1.8   --    --    --   2.1   --    PHOSPHORUS  3.1   --    --    --    --    --    --    --    CALCIUM  8.4*   --   8.8  8.9  9.1   --   9.3   --     < > = values in this interval not displayed.     Recent Labs      03/18/19 0402 03/18/19 0737  03/18/19   1318  03/18/19   1956 03/19/19   0150   ALTSGPT  13  13   --    --    --    ASTSGOT  27  27   --    --    --    ALKPHOSPHAT  55  54   --    --    --    TBILIRUBIN  1.2  0.9   --    --    --    LIPASE  36   --    --    --    --    PREALBUMIN   --    --    --   3.0*   --    GLUCOSE  170*  206*  135*   --   136*     Recent Labs      03/17/19 0349 03/18/19 0402 03/18/19 0737 03/19/19   0415   WBC  9.4  8.4   --   10.0   NEUTSPOLYS  80.60*  72.50*   --   77.20*   LYMPHOCYTES  6.20*  12.20*   --   9.10*   MONOCYTES  10.90  12.20   --   10.30   EOSINOPHILS  1.20  2.30   --   2.10   BASOPHILS  0.70  0.60   --   0.80   ASTSGOT   --   27  27   --    ALTSGPT   --   13  13   --    ALKPHOSPHAT   --   55  54   --    TBILIRUBIN   --   1.2  0.9   --      Recent Labs      03/17/19 0349 03/18/19 0402 03/19/19   0415   RBC  5.54  5.56  5.52   HEMOGLOBIN  16.5  16.6  16.4   HEMATOCRIT  54.2*  54.9*  53.5*   PLATELETCT  171  181  238       Imaging  X-Ray:  I have personally reviewed the images and compared with prior images.  EKG:  I have personally reviewed the images and compared with prior images.  Echo:   Reviewed    Assessment/Plan  Acute hypoxemic respiratory failure (HCC)- (present on admission)   Assessment & Plan    Intubated outside facility on March 12, 2019  RT/O2 protocols  Daily and PRN ABGs  Titration of ventilator therapy based on ABGs and patient's status  Sedation as tolerated/indicated  Daily CXR  HOB >30 degrees and peridex for VAP prevention  Pepcid for GI prophylaxis  SAT/SBT when able (ABCDEF Bundle)  Early  mobility when safe  Now on APRV with improved oxygenation with complete white out of right lung. --> will do bronchoscopy to rule out mucous plugging and obtain BAL sample  Put in more upright position for blood flow to bases for matching  Repeat Echo with bubble study rule out cardiac shunt vs extracardiac.   Lower ext doppler negative for dvt CTA chest negative for PE.      Edema- (present on admission)   Assessment & Plan    Forced diuresis and monitor  Patient on max ventilator of 100% and peep of 18, flolan     Will aggressive force diuresis with diuril, lasix goal net negative 4L for 3/19    Net negative goal met with negative fluid balance is on some pressor with propofol creatine stable.   Will check ScVO2 and ABG to monitor for over diuresis and loss of SV.   Continues to be warm and wet       Class 3 severe obesity due to excess calories with serious comorbidity and body mass index (BMI) of 50.0 to 59.9 in adult ([facility])- (present on admission)   Assessment & Plan    Encourage behavior modification and weight loss once extubated, family informed of the urgency of his weight loss  Nutritionist consultation  Start enteral nutrition with low calorie formula     Pneumonia due to infectious organism- (present on admission)   Assessment & Plan    No healthcare associated risk factors prior to admission  Continue Rocephin and azithromycin for a 5-day total course of antibiotics stopping today 3/18  Bronchoscopy with BAL, follow-up on cultures  pro-calcitonin elevated -> will recheck 3/18 blood cultures overnight and u/a sent may need to boarden spectrum of antibiotics.   UA at outside hospital unremarkable  Continues to be febrile ? Source (ct chest abdomen pelvis preformed 3/12 ? Cellulitis vs anasarca to abdominal wall this is consistent with anasarca.)    Will watch closely off antibiotics today last day 3/18    Discussed with ID for consult today.          Demand ischemia ([facility])- (present on admission)    Assessment & Plan    With mildly elevated troponin upon arrival at outside hospital -down trended  Continue aspirin and statin     Acute encephalopathy- (present on admission)   Assessment & Plan    Metabolic encephalopathy  Sedation vacation as tolerates  CNS infection considered unlikely given improvement in encephalopathy and no meningismus on exam    Keep patient awake during the day and avoid daytime naps. Remove all unnecessary lines (central lines, peripheral IVs, feeding tubes, cohn catheters). Avoid polypharmacy, frequent re-orientation, maximize family time at bedside, use glasses and hearing aids if needed, treat pain, encourage ambulation, minimize benzos/anticholinergic agents.     Patient on Fentanyl gtt of 400 mcg will continue to wean since he will have large lipid stores for days. Patient wasn't triggering vent yesterday.    Patient was titrated to 200mcg gtt this am on some propofol since he was biting through ET tube.            Acute on chronic systolic heart failure (HCC)   Assessment & Plan    Patient with biventricular heart failure EF 25% RV dilated LA not dilated.   Continue force diuresis  Avoid hypotension for right heart coronary perfusion.   Will  Check serial ScVO2 and ABG today, monitor CVP  Discussed with cardiology about renetta with a Phigh of 35 unclear will be able to get any useful information on filling pressures.   Patient is not in cardiogenic shock he is warm on exam  My clinical impression is that he has chronic bi-V failure of unclear ischemic vs etoh abuse with likely both chronic venous hypertension but also with untreated ELLIOT came in with erthyrocytosis and pulmonary hypertension.   Patient not a VAD candidate or heart transplant          Fever- (present on admission)   Assessment & Plan    Likely infectious vs drug no history of recent drug or alcohol abuse.   Possible drug fever related.   Will discuss with ID today for consult since today is last day of antibiotic  coverage with negative pan CT and cultures thus far.      Goals of care, counseling/discussion- (present on admission)   Assessment & Plan    Further discussion today after rounds on patient and current therapy and plan. Discussed plan that I extensively discussed case with cardiology,  infectious disease consultation and plan for a trial of aggressive therapy.     Family went on to discuss more about the patient his wishes and his tragic loss of his parents to cancer at an early age and his fear and avoidence of health care and medical services. The family was unanimous that this type of care and recurrent care with advanced heart failure would not be what he would want. After a long discussion with all family members they all agreed they wanted to focus on his comfort.      Hypokalemia- (present on admission)   Assessment & Plan    Aggressively replace K and Mg with active diuresis.        Type 2 diabetes mellitus with hyperglycemia, without long-term current use of insulin (HCC)- (present on admission)   Assessment & Plan    controlled     Hyperglycemia- (present on admission)   Assessment & Plan    Poorly controlled, non-compliant at home  Goal blood glucose 120-180  sliding scale insulin, accuchecks  hypoglycemia protocol  9.2 A1c       Hypernatremia- (present on admission)   Assessment & Plan    Continue free water enterally and monitor while ongoing needs for diuresis     Hypomagnesemia- (present on admission)   Assessment & Plan    IV repletion and monitor closely  Electrolyte placement protocol     Polycythemia vera (HCC)- (present on admission)   Assessment & Plan    Secondary to hypoxemia and obesity  Monitor, may need therapeutic phlebotomy          VTE:  Lovenox  Ulcer: H2 Antagonist  Lines: Central Line  Ongoing indication addressed, Arterial Line  Ongoing indication addressed and Arnold Catheter  Ongoing indication addressed    I have performed a physical exam and reviewed and updated ROS and Plan  today (3/19/2019). In review of yesterday's note (3/18/2019), there are no changes except as documented above.     Discussed patient condition and risk of morbidity and/or mortality with Hospitalist, RN, RT, Pharmacy, Dietary, , Charge nurse / hot rounds, Patient and cardiology     The patient remains critically ill with severe hypoxemia ventilatory management, norepinephrine with active titration and family meetings consultation with ID and cardiology.  Critical care time = 126 minutes in directly providing and coordinating critical care and extensive data review.  No time overlap and excludes procedures.

## 2019-03-19 NOTE — CARE PLAN
Problem: Respiratory:  Goal: Respiratory status will improve  Outcome: PROGRESSING SLOWER THAN EXPECTED    Intervention: Administer and titrate oxygen therapy  Ventilator setting switched from APVCMV to APRV due to intolerance of previous settings.  Intervention: Collaborate with respiratory therapist and Interdisciplinary Team on treatment measures to improve respiratory function  Respiratory therapist and MD involved in plan of care including bronchoscopy and changing vent settings.      Problem: Mobility  Goal: Risk for activity intolerance will decrease  Outcome: PROGRESSING SLOWER THAN EXPECTED    Intervention: Assess and monitor signs of activity intolerance  Patient quickly desats in the 70s and maintains when attempting mobilization to edge of bed.

## 2019-03-20 LAB
BACTERIA SPEC RESP CULT: NORMAL
GRAM STN SPEC: NORMAL
SIGNIFICANT IND 70042: NORMAL
SITE SITE: NORMAL
SOURCE SOURCE: NORMAL

## 2019-03-20 NOTE — PROGRESS NOTES
Isolation Precautions:    Patient is on *** isolation for ***.    Patient educated on reason for isolation, how the infection may be transmitted, and how to help prevent transmission to others.     Patient educated that *** precautions involves staff and visitors wearing PPE, follow  Standard Precautions and perform meticulous hand hygiene in order to prevent transmission of infection. (Contact Precautions: gown and gloves; Special Contact Precautions: gown and gloves, with the added requirement of soap and water for hand hygiene; Droplet Precautions: surgical mask worn by staff and visitors in the room, and worn by the patient when out of the room; Airborne Precautions: involves staff wearing PPE to include an N95 Respirator or Controlled Air Purifying Respirator (CAPR).  Visitors should be limited and may wear an N95 mask).         Patient transport and mobilization on unit. Patient educated that they may leave their room, but prior to exiting, the patient needs to have on a fresh patient gown, ensure the potentially infectious area is covered, perform appropriate hand hygiene immediately prior to exiting the room. (*For Airborne Precautions: Patient educated that time out of the room should be minimized and limited to transport to diagnostic procedures or other activities. Patient is to wear surgical mask when required to leave the patient room).

## 2019-03-21 LAB
BACTERIA BRONCH AEROBE CULT: NORMAL
GRAM STN SPEC: NORMAL
SIGNIFICANT IND 70042: NORMAL
SITE SITE: NORMAL
SOURCE SOURCE: NORMAL

## 2019-03-23 LAB
BACTERIA BLD CULT: NORMAL
BACTERIA BLD CULT: NORMAL
SIGNIFICANT IND 70042: NORMAL
SIGNIFICANT IND 70042: NORMAL
SITE SITE: NORMAL
SITE SITE: NORMAL
SOURCE SOURCE: NORMAL
SOURCE SOURCE: NORMAL

## 2019-04-06 NOTE — DISCHARGE SUMMARY
Death Summary    Cause of Death  Severe hypoxemic respiratory failure due to biventricular heart failure due to obstructive sleep apnea due to acute lung injury likely secondary to aspiration pneumonitis.    Comorbid Conditions at the Time of Death  Active Problems:    Acute hypoxemic respiratory failure (HCC) POA: Yes    Acute encephalopathy POA: Yes    Demand ischemia (HCC) POA: Yes    Pneumonia due to infectious organism POA: Yes    Class 3 severe obesity due to excess calories with serious comorbidity and body mass index (BMI) of 50.0 to 59.9 in adult (HCC) POA: Yes    Edema POA: Yes    Polycythemia vera (HCC) POA: Yes    Hypomagnesemia POA: Yes    Hypernatremia POA: Yes    Hyperglycemia POA: Yes    Type 2 diabetes mellitus with hyperglycemia, without long-term current use of insulin ([facility]) POA: Yes    Hypokalemia POA: Yes    Goals of care, counseling/discussion POA: Yes    Fever POA: Yes    Acute on chronic systolic heart failure (HCC) POA: Unknown  Resolved Problems:    * No resolved hospital problems. *      History of Presenting Illness and Hospital Course  This is a 48 y.o. male admitted 3/15/2019 after transfer from [facility] with biventricular heart failure and refractory hypoxemia with a patient non compliant with medical therapy. Patient had significant weight gain of 50 lbs, non compliant with ELLIOT treatment that was a difficulty intubation at [facility] that likely had aspiration event suffering pneumonitis and acute lung injury that was seen on broncoscopy with severely inflamed airways. That had a negative infectious workup while on board spectrum antibiotics. Cardiology was consulted for assist on management of chronic heart failure that was not a VAD or heart transplant candidate and difficult to determine significance of component of heart failure due his significant high high airway pressures and the likely inability to determine left side filling pressures. On  further discussion with family the patient would not want further aggressive medical care/interventions due to his traumatic loss of his parents at a young age to cancer that has made him avoid health care for his entire life. Family was unanimous that the patient would not want the care he was receiving and per these discussion he was transitioned to comfort focused care and extubated and  shortly there after without discomfort with his family at his bedside.      Death Date: 19   Death Time: 1436         Pronounced By (RN1): Bridgette Allen  Pronounced By (RN2): Case Grigsby

## 2022-12-01 NOTE — PROGRESS NOTES
Cardiology Follow Up Progress Note    Date of Service  3/18/2019    Attending Physician  Ron Tinoco M.D.    Chief Complaint   Dyspnea, intubated.    HPI  Sami Camacho is a 48 y.o. male admitted 3/15/2019 with ELLIOT, non-compliance to medical therapy and ELLIOT treatment, HTN, obesity, now with respiratory failure requiring intubation.    Interim Events  No acute events overnight.  No telemetry events.    Still intubated.    Review of Systems  Review of Systems   Unable to perform ROS: Intubated       Vital signs in last 24 hours  Temp:  [38 °C (100.4 °F)-39.1 °C (102.4 °F)] 39.1 °C (102.4 °F)  Pulse:  [] 95  Resp:  [18-58] 24  SpO2:  [91 %-96 %] 92 %    Physical Exam  Physical Exam  Constitutional:   Intubated status  HENMT:  Trach tube is in place properly  Eyes: No discharge.  Neck:  Unable to assess for JVD due to intubated status  Cardiovascular: regular rhythm, No murmurs, No rubs, No gallops.   Extremities with no edema   Lungs:  +BS anteriorly  Abdomen: no distention  Skin: Warm  Neurologic: intubated status  Psychiatric: intubated status    Lab Review  Lab Results   Component Value Date/Time    WBC 8.4 03/18/2019 04:02 AM    RBC 5.56 03/18/2019 04:02 AM    HEMOGLOBIN 16.6 03/18/2019 04:02 AM    HEMATOCRIT 54.9 (H) 03/18/2019 04:02 AM    MCV 98.7 (H) 03/18/2019 04:02 AM    MCH 29.9 03/18/2019 04:02 AM    MCHC 30.2 (L) 03/18/2019 04:02 AM    MPV 10.6 03/18/2019 04:02 AM      Lab Results   Component Value Date/Time    SODIUM 146 (H) 03/18/2019 07:37 AM    POTASSIUM 3.2 (L) 03/18/2019 07:37 AM    CHLORIDE 106 03/18/2019 07:37 AM    CO2 34 (H) 03/18/2019 07:37 AM    GLUCOSE 206 (H) 03/18/2019 07:37 AM    BUN 24 (H) 03/18/2019 07:37 AM    CREATININE 1.03 03/18/2019 07:37 AM      Lab Results   Component Value Date/Time    ASTSGOT 27 03/18/2019 07:37 AM    ALTSGPT 13 03/18/2019 07:37 AM     Lab Results   Component Value Date/Time    TRIGLYCERIDE 121 03/17/2019 12:47 AM    TROPONINI 0.19 ()  03/13/2019 12:00 PM             Cardiac Imaging and Procedures Review  EKG:  My personal interpretation of the EKG dated 03/18/2019 is sinus rhythm without ACS.    Echocardiogram:  03/13/2019 LVEF of 25%, right heart failure.    Cardiac Catheterization:  None.    Imaging  Chest X-Ray:  + edema.     Stress Test:  None.    Assessment/Plan  Active Problems:    Acute hypoxemic respiratory failure (HCC) POA: Yes    Acute on chronic systolic congestive heart failure (HCC) POA: Yes    Acute encephalopathy POA: Yes    Demand ischemia (HCC) POA: Yes    Pneumonia due to infectious organism POA: Yes    Class 3 severe obesity due to excess calories with serious comorbidity and body mass index (BMI) of 50.0 to 59.9 in adult ([facility]) POA: Yes    Edema POA: Yes    Polycythemia vera ([facility]) POA: Yes    Hypomagnesemia POA: Yes    Hypernatremia POA: Yes    Hyperglycemia POA: Yes    Type 2 diabetes mellitus with hyperglycemia, without long-term current use of insulin ([facility]) POA: Yes    Hypokalemia POA: Yes    Goals of care, counseling/discussion POA: Yes    Fever POA: Yes  Resolved Problems:    * No resolved hospital problems. *    Poor prognosis overall.  No further cardiac work up at this time.  Continue supportive care.  Optimize electrolytes to keep Mg above 2.5 and Potassium above 4.5.  Once extubated and out of ICU, could pursue outpatient ischemic work up.    Will sign off at this time, please call us with further questions.  Patient will be followed in the outpatient cardiology clinic for further cardiac care if he made it out of ICU without neurological deficits.    Thank you for referring this patient to our cardiology service.    Mei Bowman MD.   [facility].     Statement Selected